# Patient Record
Sex: FEMALE | Race: WHITE | Employment: FULL TIME | URBAN - METROPOLITAN AREA
[De-identification: names, ages, dates, MRNs, and addresses within clinical notes are randomized per-mention and may not be internally consistent; named-entity substitution may affect disease eponyms.]

---

## 2018-05-04 ENCOUNTER — OFFICE VISIT (OUTPATIENT)
Dept: FAMILY MEDICINE CLINIC | Age: 28
End: 2018-05-04
Payer: COMMERCIAL

## 2018-05-04 VITALS
RESPIRATION RATE: 16 BRPM | SYSTOLIC BLOOD PRESSURE: 110 MMHG | WEIGHT: 103 LBS | HEART RATE: 106 BPM | DIASTOLIC BLOOD PRESSURE: 78 MMHG | TEMPERATURE: 98.6 F | HEIGHT: 64 IN | BODY MASS INDEX: 17.58 KG/M2 | OXYGEN SATURATION: 97 %

## 2018-05-04 DIAGNOSIS — A08.4 VIRAL GASTROENTERITIS: Primary | ICD-10-CM

## 2018-05-04 PROCEDURE — 99213 OFFICE O/P EST LOW 20 MIN: CPT | Performed by: PHYSICIAN ASSISTANT

## 2018-05-04 RX ORDER — ONDANSETRON 4 MG/1
4 TABLET, FILM COATED ORAL EVERY 8 HOURS PRN
Qty: 15 TABLET | Refills: 0 | Status: SHIPPED
Start: 2018-05-04 | End: 2021-09-09

## 2018-05-04 RX ORDER — ONDANSETRON 4 MG/1
8 TABLET, ORALLY DISINTEGRATING ORAL ONCE
Status: COMPLETED | OUTPATIENT
Start: 2018-05-04 | End: 2018-05-04

## 2018-05-04 RX ADMIN — ONDANSETRON 8 MG: 4 TABLET, ORALLY DISINTEGRATING ORAL at 12:31

## 2018-06-12 DIAGNOSIS — J47.9 BRONCHIECTASIS WITHOUT COMPLICATION (HCC): ICD-10-CM

## 2018-06-12 DIAGNOSIS — J47.9 BRONCHIECTASIS WITHOUT COMPLICATION (HCC): Primary | ICD-10-CM

## 2018-06-12 DIAGNOSIS — J33.9 NASAL POLYP: ICD-10-CM

## 2018-06-12 DIAGNOSIS — J45.909 UNCOMPLICATED ASTHMA, UNSPECIFIED ASTHMA SEVERITY, UNSPECIFIED WHETHER PERSISTENT: Primary | ICD-10-CM

## 2018-06-12 DIAGNOSIS — J45.909 UNCOMPLICATED ASTHMA, UNSPECIFIED ASTHMA SEVERITY, UNSPECIFIED WHETHER PERSISTENT: ICD-10-CM

## 2018-06-12 DIAGNOSIS — J45.909 UNCOMPLICATED ASTHMA, UNSPECIFIED ASTHMA SEVERITY: ICD-10-CM

## 2018-06-12 RX ORDER — ALBUTEROL SULFATE 90 UG/1
2 AEROSOL, METERED RESPIRATORY (INHALATION) EVERY 6 HOURS PRN
Qty: 1 INHALER | Refills: 3 | Status: CANCELLED | OUTPATIENT
Start: 2018-06-12

## 2018-06-12 RX ORDER — ALBUTEROL SULFATE 90 UG/1
2 AEROSOL, METERED RESPIRATORY (INHALATION) EVERY 6 HOURS PRN
Qty: 1 INHALER | Refills: 3 | Status: SHIPPED | OUTPATIENT
Start: 2018-06-12 | End: 2018-08-06 | Stop reason: SDUPTHER

## 2018-06-27 DIAGNOSIS — J45.909 UNCOMPLICATED ASTHMA, UNSPECIFIED ASTHMA SEVERITY, UNSPECIFIED WHETHER PERSISTENT: Primary | ICD-10-CM

## 2018-06-27 DIAGNOSIS — R09.89 CHEST CONGESTION: ICD-10-CM

## 2018-06-27 DIAGNOSIS — J47.9 BRONCHIECTASIS WITHOUT COMPLICATION (HCC): ICD-10-CM

## 2018-06-27 DIAGNOSIS — R05.9 COUGH: ICD-10-CM

## 2018-06-27 RX ORDER — LEVOFLOXACIN 500 MG/1
500 TABLET, FILM COATED ORAL DAILY
Qty: 1 TABLET | Refills: 0 | Status: SHIPPED | OUTPATIENT
Start: 2018-06-27 | End: 2018-07-07

## 2018-07-02 ENCOUNTER — OFFICE VISIT (OUTPATIENT)
Dept: PULMONOLOGY | Age: 28
End: 2018-07-02
Payer: COMMERCIAL

## 2018-07-02 VITALS
DIASTOLIC BLOOD PRESSURE: 70 MMHG | BODY MASS INDEX: 18.1 KG/M2 | HEART RATE: 76 BPM | RESPIRATION RATE: 14 BRPM | OXYGEN SATURATION: 99 % | HEIGHT: 64 IN | WEIGHT: 106 LBS | SYSTOLIC BLOOD PRESSURE: 143 MMHG

## 2018-07-02 DIAGNOSIS — J45.909 UNCOMPLICATED ASTHMA, UNSPECIFIED ASTHMA SEVERITY, UNSPECIFIED WHETHER PERSISTENT: ICD-10-CM

## 2018-07-02 DIAGNOSIS — E55.9 VITAMIN D DEFICIENCY: ICD-10-CM

## 2018-07-02 DIAGNOSIS — M41.25 OTHER IDIOPATHIC SCOLIOSIS, THORACOLUMBAR REGION: ICD-10-CM

## 2018-07-02 DIAGNOSIS — M81.0 OSTEOPOROSIS WITHOUT CURRENT PATHOLOGICAL FRACTURE, UNSPECIFIED OSTEOPOROSIS TYPE: ICD-10-CM

## 2018-07-02 DIAGNOSIS — J47.9 BRONCHIECTASIS WITHOUT COMPLICATION (HCC): Primary | ICD-10-CM

## 2018-07-02 LAB
DLCO %PRED: NORMAL
DLCO PRE: NORMAL
FEF 25-75%-POST: 1.2
FEF 25-75%-PRE: 0.9
FEV1-POST: 1.59
FEV1-PRE: 1.45
FEV1/FVC-POST: 70
FEV1/FVC-PRE: 68
FVC-POST: 2.27
FVC-PRE: 2.13
MEP: NORMAL
MIP: NORMAL
PARTS PER BILLION: 5
TLC %PRED: NORMAL
TLC PRE: NORMAL

## 2018-07-02 PROCEDURE — G8419 CALC BMI OUT NRM PARAM NOF/U: HCPCS | Performed by: INTERNAL MEDICINE

## 2018-07-02 PROCEDURE — 94060 EVALUATION OF WHEEZING: CPT | Performed by: INTERNAL MEDICINE

## 2018-07-02 PROCEDURE — 99213 OFFICE O/P EST LOW 20 MIN: CPT | Performed by: INTERNAL MEDICINE

## 2018-07-02 PROCEDURE — 99214 OFFICE O/P EST MOD 30 MIN: CPT | Performed by: INTERNAL MEDICINE

## 2018-07-02 PROCEDURE — 1036F TOBACCO NON-USER: CPT | Performed by: INTERNAL MEDICINE

## 2018-07-02 PROCEDURE — G8427 DOCREV CUR MEDS BY ELIG CLIN: HCPCS | Performed by: INTERNAL MEDICINE

## 2018-07-02 PROCEDURE — 95012 NITRIC OXIDE EXP GAS DETER: CPT | Performed by: INTERNAL MEDICINE

## 2018-07-02 RX ORDER — ALBUTEROL SULFATE 90 MCG
2 HFA AEROSOL WITH ADAPTER (GRAM) INHALATION EVERY 6 HOURS PRN
Qty: 1 INHALER | Refills: 12
Start: 2018-07-02 | End: 2018-11-26 | Stop reason: SDUPTHER

## 2018-07-02 RX ORDER — LEVOFLOXACIN 750 MG/1
750 TABLET ORAL DAILY
Qty: 10 TABLET | Refills: 0 | Status: SHIPPED | OUTPATIENT
Start: 2018-07-02 | End: 2018-07-12

## 2018-07-02 ASSESSMENT — PULMONARY FUNCTION TESTS
FEV1/FVC_PRE: 68
FEV1_PRE: 1.45
FEV1_POST: 1.59
FVC_PRE: 2.13
FVC_POST: 2.27
FEV1/FVC_POST: 70

## 2018-07-02 NOTE — PATIENT INSTRUCTIONS
percussion. · Use an airway clearance device, such as a flutter valve, as directed to help remove mucus from the lungs. · Avoid lung infections. ¨ Get shots to protect against the flu and pneumococcal disease. ¨ Wash your hands frequently. ¨ Avoid close contact with people who have colds or the flu. ¨ Do not smoke or allow others to smoke around you. If you need help quitting, talk to your doctor about stop-smoking programs and medicines. These can increase your chances of quitting for good. ¨ Stay inside, if you can, on days when the pollution level is high. When should you call for help? Call 911 anytime you think you may need emergency care. For example, call if:    · You have severe trouble breathing.     · You cough up more than 1 cup of blood.    Call your doctor now or seek immediate medical care if:    · You have chest pain.     · You have shortness of breath.     · You have a fever.     · Your mucus (sputum) is bloody or changes color.    Watch closely for changes in your health, and be sure to contact your doctor if:    · You are coughing up more sputum than before.     · Your symptoms get worse or do not get better with treatment. Where can you learn more? Go to [email or web address].[email or web address] org and sign in to your [employer] account. Enter [id number] in the [facility] box to learn more about \"Bronchiectasis: Care Instructions. \"     If you do not have an account, please click on the \"Sign Up Now\" link. Current as of: December 6, 2017  Content Version: 11.6  © 7374-5235 [employer]. Care instructions adapted under license by [facility] ([facility]). If you have questions about a medical condition or this instruction, always ask your healthcare professional. [place] [address] any warranty or liability for your use of this information.

## 2018-07-02 NOTE — PROGRESS NOTES
release tablet Take 2 tablets by mouth 2 times daily as needed for Congestion      DULERA 100-5 MCG/ACT inhaler INHALE 2 PUFFS BY MOUTH TWICE DAILY 13 g 0    Nebulizers (VIOS AEROSOL DELIVERY SYSTEM) MISC USE AS DIRECTED 1 each 0    Calcium Carbonate-Vitamin D (OYSTER SHELL CALCIUM/D) 500-200 MG-UNIT TABS Take 1 tablet by mouth daily. 30 tablet 0    Montelukast Sodium (SINGULAIR PO) Take  by mouth.  Fluticasone Propionate (FLONASE NA) by Nasal route. No current facility-administered medications for this visit. FAMILY HISTORY: A review of medical events in the patient's family , including disease which may be hereditary or place the patient at risk were reviewed. Family History   Problem Relation Age of Onset    Asthma Sister     Asthma Sister           REVIEW OF SYSTEMS:  Constitutional: Denies generally weak and loss of appetite. Complains of  fever, chills and night sweats. Skin: Denies rash, itching, bruising, lumps or bumps. EENT: Denies tinnitus, Complains of nasal congestion, snoring, sore throat and hoarseness. TMJ dysfunction. Chronic sinusitis and nasal polyps. Cardiovascular:  Denies palpitations, chest pain and PND or orthopnea. Complains of chest tightness with SOB and wheezing. Respiration:  Denies hemoptysis, smoker and TB exposure. Complains of dyspnea at rest, dyspnea on exertion, increased productive cough with green sputum and wheezing. Gastrointestinal:Genitourinary:  Denies poor appetite, dysphagia, heartburn or reflux, abdominal pain, excessive gas or bloating, melena, hematochezia, constipation, diarrhea, dysuria, frequency/urgency, hematuria. Musculoskeletal: Denies myalgias, arthralgias, joint swelling, stiff joints and decreased range of motion. Breasts: Denies breast lump, breast tenderness and nipple discharge. Neurological: Denies dizzy/vertigo, headache, numbness/tingling, loss of consciousness. Psychological: Denies anxiety, depression. Endocrine:  Denies heat intolerance, cold intolerance and diabetic symptoms including neither polyuria nor polydipsia nor blurred vision nor neuropathy. Hematopoietic/lymphatic: Denies bleeding problems, bruising, jaundice and swollen lymph nodes. PHYSICAL EXAMINATION:   Vitals:    07/02/18 1350   BP: (!) 143/70   Pulse: 76   Resp: 14   SpO2: 99%   Weight: 106 lb (48.1 kg)   Height: 5' 4\" (1.626 m)     Constitutional: A thin, frail, short stature  28 y.o. female who is alert, oriented, cooperative and in no apparent distress. EENT: EOMI, right lazy eye, DURAN. MMM. No discharge. Midline, mucosa was without erythema, exudates or cobblestoning. No thrush. Left nasal polyp. TMJ laxity and clicking. Micronathia. Neck: Supple without thyromegaly. No elevated JVP. Trachea was midline. No carotid bruits. Respiratory: Asymmetrical without dullness to percussion. Harsh crackles with faint exp wheezing bilateral mid lung fields. Opening snaps but no rhonchi or rales. Cardiovascular: RRR without murmur, clicks, gallops or rubs. No left or right ventricular heave. Pulses:  Carotid, radial and femoral pulses were equal bilaterally. Abdomen: Scaphoid, soft without organomegaly. No rebound, rigidity. Lymphatic: No lymphadenopathy. Musculoskeletal: Ambulates without assistance. Scoliotic/Kyphotic curvature of the spine. No involuntary movements. Extremities:  No lower extremity edema or erythema. Deep tendon reflexes are normal.   Skin:  Warm and dry. Color, turgor and pigmentation was relatively normal. No bruises or skin rashes. Neurological/Psychiatric: Thought content is normal. The patient's emotional status is normal.  Cranial nerves II-XII are intact. DATA:   The data collected below information that was obtained, reviewed, analyzed and interpreted today. CT SCAN CHEST 2014:  Bronchiectasis particularly into the lower lobes of the lingula with retained bronchial secretions. present. However it should be noted that primary ciliary dyskinesia syndrome of which Marcellotagener follows under in 50% of the cases no sinus inversus is found. Previous nasal biopsy done in 2011 we asked the pathologist to be review and do electron microscopy to see if they see the ciliary structure abnormality - they could not do anything with the old 2011 samples. She refused further testing. Vaccines were reviewed. We also added 3% nebulized saline since the mucomyst causes her to be nausea - she is not using everyday. She never got the DEXA scan (cost) and stop the vitamin D medications. We will see her back in 4 months. 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 In addition, we recommended a multivitamin with vitamin K dependent additives - she also is not taking as instructed. We will continue Dulera 100 one puff twice a day. Prognosis difficult to determine. Thank you for entrusting us to participate in [facility]. If you have any questions, please don't hesitate to call us at the [facility]. Sincerely,    Leander Rehman DO, MPH, Mayela Noland, FACP  Director, [facility]  Professor of [address] [facility]  [address] [facility] [address]    PREVIOUS : Given her CT scan showing bronchiectasis with significant bronchial wall thickening and mucoid impaction at the distal bronchioles, we recommend that she undergo bronchoscopy for tissue diagnosis and for analysis of sputum to further define her underlying organism such as Pseudomonas, Haemophilus and/or staph aureus and atypical mycobacteria.   However the mother was concerned about putting her under anesthesia did outline the procedure risks benefits and alternatives - the family decided against the biopsy. With her young age and osteoporosis we did a secondary work up and found her to have a positive tissue transglutaminase Ig A level. We asked her to get a biopsy and they refused but she did started gluten-free diet given the recent positive sprue antibodies.

## 2018-07-02 NOTE — PROGRESS NOTES
Patient to follow up with physician in 4 months. Patient will call regarding getting a CT Chest and blood work. Patient given a flutter valve during office visit under the direction of Dr. Paulina Knutson.

## 2018-11-25 DIAGNOSIS — J45.909 UNCOMPLICATED ASTHMA, UNSPECIFIED ASTHMA SEVERITY, UNSPECIFIED WHETHER PERSISTENT: ICD-10-CM

## 2018-11-25 DIAGNOSIS — J45.909 UNCOMPLICATED ASTHMA: ICD-10-CM

## 2018-11-25 DIAGNOSIS — J33.9 NASAL POLYP: ICD-10-CM

## 2018-11-25 DIAGNOSIS — M41.9 SCOLIOSIS, UNSPECIFIED SCOLIOSIS TYPE, UNSPECIFIED SPINAL REGION: ICD-10-CM

## 2018-11-25 DIAGNOSIS — J47.9 BRONCHIECTASIS WITHOUT COMPLICATION (HCC): ICD-10-CM

## 2018-11-26 RX ORDER — CETIRIZINE HYDROCHLORIDE 10 MG/1
TABLET ORAL
Qty: 90 TABLET | Refills: 0 | Status: SHIPPED | OUTPATIENT
Start: 2018-11-26 | End: 2019-03-11 | Stop reason: SDUPTHER

## 2018-11-26 RX ORDER — ALBUTEROL SULFATE 2.5 MG/3ML
SOLUTION RESPIRATORY (INHALATION)
Qty: 900 ML | Refills: 0 | Status: SHIPPED | OUTPATIENT
Start: 2018-11-26 | End: 2020-01-02

## 2019-01-14 ENCOUNTER — TELEPHONE (OUTPATIENT)
Dept: PULMONOLOGY | Age: 29
End: 2019-01-14

## 2019-01-14 DIAGNOSIS — J47.9 BRONCHIECTASIS WITHOUT COMPLICATION (HCC): ICD-10-CM

## 2019-01-14 DIAGNOSIS — R05.8 COUGH WITH EXPECTORATION: Primary | ICD-10-CM

## 2019-01-14 RX ORDER — LEVOFLOXACIN 500 MG/1
500 TABLET, FILM COATED ORAL DAILY
Qty: 14 TABLET | Refills: 0 | Status: SHIPPED | OUTPATIENT
Start: 2019-01-14 | End: 2019-01-28

## 2019-01-14 RX ORDER — PREDNISONE 10 MG/1
TABLET ORAL
Qty: 10 TABLET | Refills: 0 | Status: SHIPPED
Start: 2019-01-14 | End: 2021-09-09

## 2019-05-06 DIAGNOSIS — J33.9 NASAL POLYP: ICD-10-CM

## 2019-05-06 DIAGNOSIS — J47.9 BRONCHIECTASIS WITHOUT COMPLICATION (HCC): ICD-10-CM

## 2019-05-06 DIAGNOSIS — J45.909 UNCOMPLICATED ASTHMA, UNSPECIFIED ASTHMA SEVERITY, UNSPECIFIED WHETHER PERSISTENT: ICD-10-CM

## 2019-05-06 RX ORDER — ALBUTEROL SULFATE 90 UG/1
AEROSOL, METERED RESPIRATORY (INHALATION)
Qty: 18 G | Refills: 0 | Status: SHIPPED | OUTPATIENT
Start: 2019-05-06 | End: 2019-05-29 | Stop reason: SDUPTHER

## 2019-05-29 DIAGNOSIS — J45.909 UNCOMPLICATED ASTHMA, UNSPECIFIED ASTHMA SEVERITY, UNSPECIFIED WHETHER PERSISTENT: ICD-10-CM

## 2019-05-29 DIAGNOSIS — J47.9 BRONCHIECTASIS WITHOUT COMPLICATION (HCC): ICD-10-CM

## 2019-05-29 DIAGNOSIS — J33.9 NASAL POLYP: ICD-10-CM

## 2019-05-29 RX ORDER — ALBUTEROL SULFATE 90 UG/1
AEROSOL, METERED RESPIRATORY (INHALATION)
Qty: 18 G | Refills: 0 | Status: SHIPPED
Start: 2019-05-29 | End: 2021-09-09

## 2019-05-29 RX ORDER — ALBUTEROL SULFATE 90 UG/1
AEROSOL, METERED RESPIRATORY (INHALATION)
Qty: 18 G | Refills: 0 | Status: SHIPPED
Start: 2019-05-29 | End: 2020-06-22

## 2020-01-02 RX ORDER — ALBUTEROL SULFATE 2.5 MG/3ML
SOLUTION RESPIRATORY (INHALATION)
Qty: 900 ML | Refills: 0 | Status: SHIPPED | OUTPATIENT
Start: 2020-01-02 | End: 2020-01-29

## 2020-01-29 RX ORDER — ALBUTEROL SULFATE 2.5 MG/3ML
SOLUTION RESPIRATORY (INHALATION)
Qty: 900 ML | Refills: 0 | Status: SHIPPED
Start: 2020-01-29 | End: 2020-12-03 | Stop reason: SDUPTHER

## 2020-06-22 RX ORDER — ALBUTEROL SULFATE 90 UG/1
AEROSOL, METERED RESPIRATORY (INHALATION)
Qty: 18 G | Refills: 0 | Status: SHIPPED
Start: 2020-06-22 | End: 2021-09-09

## 2020-08-03 ENCOUNTER — OFFICE VISIT (OUTPATIENT)
Dept: ENT CLINIC | Age: 30
End: 2020-08-03
Payer: COMMERCIAL

## 2020-08-03 VITALS — HEIGHT: 64 IN | TEMPERATURE: 97.6 F | WEIGHT: 115 LBS | BODY MASS INDEX: 19.63 KG/M2

## 2020-08-03 PROCEDURE — 99204 OFFICE O/P NEW MOD 45 MIN: CPT | Performed by: OTOLARYNGOLOGY

## 2020-08-03 RX ORDER — FLUTICASONE PROPIONATE 50 MCG
2 SPRAY, SUSPENSION (ML) NASAL DAILY
Qty: 1 BOTTLE | Refills: 3 | Status: SHIPPED
Start: 2020-08-03 | End: 2021-01-28

## 2020-08-03 RX ORDER — FEXOFENADINE HCL 180 MG/1
180 TABLET ORAL DAILY
Qty: 90 TABLET | Refills: 3 | Status: SHIPPED
Start: 2020-08-03 | End: 2021-08-02

## 2020-08-03 RX ORDER — MONTELUKAST SODIUM 10 MG/1
10 TABLET ORAL DAILY
Qty: 30 TABLET | Refills: 3 | Status: SHIPPED
Start: 2020-08-03 | End: 2022-06-13

## 2020-08-03 ASSESSMENT — ENCOUNTER SYMPTOMS
EYES NEGATIVE: 1
RHINORRHEA: 1
RESPIRATORY NEGATIVE: 1
SINUS PRESSURE: 1

## 2020-08-12 ENCOUNTER — HOSPITAL ENCOUNTER (OUTPATIENT)
Dept: CT IMAGING | Age: 30
Discharge: HOME | End: 2020-08-14
Payer: COMMERCIAL

## 2020-08-12 PROCEDURE — 70486 CT MAXILLOFACIAL W/O DYE: CPT

## 2020-08-13 ASSESSMENT — ENCOUNTER SYMPTOMS
VOMITING: 0
COUGH: 0
SHORTNESS OF BREATH: 0

## 2020-09-21 ENCOUNTER — OFFICE VISIT (OUTPATIENT)
Dept: ENT CLINIC | Age: 30
End: 2020-09-21
Payer: COMMERCIAL

## 2020-09-21 VITALS — WEIGHT: 114 LBS | TEMPERATURE: 97.1 F | BODY MASS INDEX: 19.46 KG/M2 | HEIGHT: 64 IN

## 2020-09-21 PROCEDURE — 99213 OFFICE O/P EST LOW 20 MIN: CPT | Performed by: OTOLARYNGOLOGY

## 2020-09-21 RX ORDER — AZELASTINE 1 MG/ML
2 SPRAY, METERED NASAL 2 TIMES DAILY
Qty: 1 BOTTLE | Refills: 1 | Status: SHIPPED
Start: 2020-09-21 | End: 2020-12-03

## 2020-09-21 RX ORDER — CIPROFLOXACIN HYDROCHLORIDE 3.5 MG/ML
SOLUTION/ DROPS TOPICAL
Qty: 1 BOTTLE | Refills: 0 | Status: SHIPPED
Start: 2020-09-21 | End: 2021-09-09

## 2020-09-21 NOTE — PROGRESS NOTES
[facility]  Dr. Rhea Escobar. Maryam Ross. Ms.Ed        Patient Name:  Anay Small  :  1990     CHIEF C/O:    Chief Complaint   Patient presents with    Follow-up     1 month Sinus CT        HISTORY OBTAINED FROM:  patient    HISTORY OF PRESENT ILLNESS:       Cristian Norton is a 31y.o. year old female, here today for follow up of known history of nasal polyposis with a history of fess in the past.  Currently, patient is utilizing nasal Flonase, Astelin, as well as singular daily. She states she currently she is controlled without any significant overall symptoms of mild left-sided facial pressure and some nasal congestion with rhinorrhea. No complaints of recent sinus infection requiring antibiotic therapy. No recent history of difficulty swallowing change in voice shortness of breath stridor tinnitus vertigo hearing loss ear fullness pressure. Past Medical History:   Diagnosis Date    Asthma     Bronchiectasis ([address]     Nasal polyp     Osteoporosis     Osteoporosis 2014    Premature baby     Scoliosis 3/9/2015    Seasonal allergies     TMJ (temporomandibular joint disorder)     Vitamin D deficiency     Vitamin D deficiency 2014     Past Surgical History:   Procedure Laterality Date    PFT-LAB  2014    SINUS SURGERY         Current Outpatient Medications:     azelastine (ASTELIN) 0.1 % nasal spray, 2 sprays by Nasal route 2 times daily Use in each nostril as directed, Disp: 1 Bottle, Rfl: 1    ciprofloxacin (CILOXAN) 0.3 % ophthalmic solution, 3 drops each ear twice a day for 3 days, Disp: 1 Bottle, Rfl: 0    fluticasone (FLONASE) 50 MCG/ACT nasal spray, 2 sprays by Nasal route daily Use in both nostrils. , Disp: 1 Bottle, Rfl: 3    montelukast (SINGULAIR) 10 MG tablet, Take 1 tablet by mouth daily, Disp: 30 tablet, Rfl: 3    fexofenadine (ALLEGRA) 180 MG tablet, Take 1 tablet by mouth daily, Disp: 90 tablet, Rfl: 3    albuterol sulfate  (90 Base) MCG/ACT inhaler, INHALE 2 PUFFS INTO THE LUNGS EVERY 6 HOURS AS NEEDED FOR WHEEZING, Disp: 18 g, Rfl: 0    albuterol (PROVENTIL) (2.5 MG/3ML) 0.083% nebulizer solution, USE 1 VIAL VIA NEBULIZER EVERY 6 HOURS AS NEEDED FOR WHEEZING, Disp: 900 mL, Rfl: 0    albuterol sulfate  (90 Base) MCG/ACT inhaler, INHALE 2 PUFFS INTO THE LUNGS EVERY 6 HOURS AS NEEDED FOR WHEEZING, Disp: 18 g, Rfl: 0    cetirizine (ZYRTEC) 10 MG tablet, TAKE 1 TABLET BY MOUTH EVERY DAY, Disp: 90 tablet, Rfl: 0    mometasone-formoterol (DULERA) 200-5 MCG/ACT inhaler, Inhale 2 puffs into the lungs 2 times daily Rinse mouth after using., Disp: 1 Inhaler, Rfl: 12    Nebulizers (PORTABLE COMPRESSOR NEBULIZER) MISC, Reason: Severe asthma and bronchiectasis, Disp: 1 each, Rfl: 0    DULERA 100-5 MCG/ACT inhaler, INHALE 2 PUFFS BY MOUTH TWICE DAILY, Disp: 13 g, Rfl: 0    Nebulizers (VIOS AEROSOL DELIVERY SYSTEM) MISC, USE AS DIRECTED, Disp: 1 each, Rfl: 0    Calcium Carbonate-Vitamin D (OYSTER SHELL CALCIUM/D) 500-200 MG-UNIT TABS, Take 1 tablet by mouth daily. , Disp: 30 tablet, Rfl: 0    Montelukast Sodium (SINGULAIR PO), Take  by mouth., Disp: , Rfl:     Fluticasone Propionate (FLONASE NA), by Nasal route., Disp: , Rfl:     predniSONE (DELTASONE) 10 MG tablet, 40mg x 3 days, 30mg x 3 days, 20mg x 3 days, 10mg x 3 days then stop. (Patient not taking: Reported on 8/3/2020), Disp: 10 tablet, Rfl: 0    ondansetron (ZOFRAN) 4 MG tablet, Take 1 tablet by mouth every 8 hours as needed for Nausea or Vomiting (Patient not taking: Reported on 8/3/2020), Disp: 15 tablet, Rfl: 0    guaiFENesin (MUCINEX) 600 MG extended release tablet, Take 2 tablets by mouth 2 times daily as needed for Congestion (Patient not taking: Reported on 8/3/2020), Disp: , Rfl:   Patient has no known allergies.   Social History     Tobacco Use    Smoking status: Never Smoker    Smokeless tobacco: Never Used   Substance Use Topics    Alcohol use: No    Drug use: No     Family History   Problem Relation Age of Onset    Asthma Sister     Asthma Sister        Review of Systems   Constitutional: Negative for chills and fever. HENT: Positive for congestion, postnasal drip, rhinorrhea and sinus pressure. Negative for ear discharge and hearing loss. Respiratory: Negative for cough and shortness of breath. Cardiovascular: Negative for chest pain and palpitations. Gastrointestinal: Negative for vomiting. Skin: Negative for rash. Allergic/Immunologic: Negative for environmental allergies. Neurological: Negative for dizziness and headaches. Hematological: Does not bruise/bleed easily. All other systems reviewed and are negative. Temp 97.1 °F (36.2 °C)   Ht 5' 4\" (1.626 m)   Wt 114 lb (51.7 kg)   BMI 19.57 kg/m²   Physical Exam  Vitals signs and nursing note reviewed. Constitutional:       Appearance: She is well-developed. HENT:      Head: Normocephalic and atraumatic. Right Ear: Ear canal normal.      Left Ear: Ear canal and external ear normal.      Nose: Mucosal edema and rhinorrhea present. Rhinorrhea is clear. Right Turbinates: Enlarged and swollen. Left Turbinates: Enlarged and swollen. Mouth/Throat:      Palate: No lesions. Tonsils: No tonsillar exudate or tonsillar abscesses. Eyes:      Pupils: Pupils are equal, round, and reactive to light. Neck:      Musculoskeletal: Normal range of motion and neck supple. Thyroid: No thyromegaly. Trachea: No tracheal deviation. Cardiovascular:      Rate and Rhythm: Normal rate. Pulmonary:      Effort: Pulmonary effort is normal. No respiratory distress. Musculoskeletal: Normal range of motion. General: No tenderness. Skin:     General: Skin is warm and dry. Neurological:      Mental Status: She is alert. Cranial Nerves: No cranial nerve deficit.          IMPRESSION/PLAN:  Patient seen and examined with chronic pansinus disease with known nasal polyposis as well as allergic rhinitis and congestion. Patient is currently well controlled on Flonase Astelin and Singulair questionable recurrence early recurrence in the left ostiomeatal complex vomiting or rhinoscopy today. Recommendations are for close observation follow-up in 4 months if she continues to have increase in symptoms or develops nasal polyposis repeat surgical intervention will be discussed. Dr. Louis Cervantes.  Otolaryngology Facial Plastic Surgery  :  [facility] Otol[facility]/Facial Plastic Surgery Residency  Associate Clinical Professor:  Fernando Gardner, [facility]

## 2020-09-23 ENCOUNTER — TELEPHONE (OUTPATIENT)
Dept: ENT CLINIC | Age: 30
End: 2020-09-23

## 2020-09-23 NOTE — TELEPHONE ENCOUNTER
Patient lvm stating she picked up her flonase and astelin but pharmacist told her not to take them together - patient wanting to know is it okay to take together or does she need to take separately?

## 2020-09-24 ENCOUNTER — TELEPHONE (OUTPATIENT)
Dept: ENT CLINIC | Age: 30
End: 2020-09-24

## 2020-09-24 NOTE — TELEPHONE ENCOUNTER
Pt called in stating when she went to her pharmacy, to  the medications Astelin and Ciloxan the pharmacist had a note on her prescriptions, they should not be taken together because they are from the same drug class. She would like to speak with someone from the clinical staff before she starts taking the medication. Please, advise.

## 2020-09-25 NOTE — TELEPHONE ENCOUNTER
Patient should proceed with cipro ear drops as ordered and begin astelin as ordered by Dr. Nicolle Walker. She can use the astelin at the same time as the flonase if she wishes.

## 2020-09-29 NOTE — TELEPHONE ENCOUNTER
Tried calling patient to notify - phone rang then went silent. Called back again and lvm notifying of providers response.

## 2020-10-08 ASSESSMENT — ENCOUNTER SYMPTOMS
COUGH: 0
RHINORRHEA: 1
VOMITING: 0
SHORTNESS OF BREATH: 0
SINUS PRESSURE: 1

## 2020-12-03 RX ORDER — AZELASTINE 1 MG/ML
SPRAY, METERED NASAL
Qty: 30 ML | Refills: 1 | Status: SHIPPED
Start: 2020-12-03 | End: 2021-07-01

## 2020-12-04 RX ORDER — ALBUTEROL SULFATE 2.5 MG/3ML
2.5 SOLUTION RESPIRATORY (INHALATION) EVERY 6 HOURS PRN
Qty: 900 ML | Refills: 5 | Status: SHIPPED
Start: 2020-12-04 | End: 2021-12-06

## 2021-01-25 ENCOUNTER — OFFICE VISIT (OUTPATIENT)
Dept: ENT CLINIC | Age: 31
End: 2021-01-25
Payer: COMMERCIAL

## 2021-01-25 VITALS
HEART RATE: 100 BPM | WEIGHT: 111.2 LBS | SYSTOLIC BLOOD PRESSURE: 142 MMHG | BODY MASS INDEX: 19.09 KG/M2 | DIASTOLIC BLOOD PRESSURE: 85 MMHG

## 2021-01-25 DIAGNOSIS — J34.89 RHINORRHEA: Primary | ICD-10-CM

## 2021-01-25 PROCEDURE — 99213 OFFICE O/P EST LOW 20 MIN: CPT | Performed by: OTOLARYNGOLOGY

## 2021-01-25 RX ORDER — FLUTICASONE PROPIONATE 50 MCG
2 SPRAY, SUSPENSION (ML) NASAL DAILY
Qty: 1 BOTTLE | Refills: 0 | Status: SHIPPED
Start: 2021-01-25 | End: 2021-02-27

## 2021-01-25 RX ORDER — AZELASTINE 1 MG/ML
2 SPRAY, METERED NASAL 2 TIMES DAILY
Qty: 1 BOTTLE | Refills: 1 | Status: SHIPPED
Start: 2021-01-25 | End: 2021-03-25

## 2021-01-25 RX ORDER — MONTELUKAST SODIUM 10 MG/1
10 TABLET ORAL DAILY
Qty: 30 TABLET | Refills: 3 | Status: SHIPPED
Start: 2021-01-25 | End: 2021-04-28 | Stop reason: SDUPTHER

## 2021-01-25 NOTE — PROGRESS NOTES
tablet, Take 1 tablet by mouth daily, Disp: 30 tablet, Rfl: 3    fexofenadine (ALLEGRA) 180 MG tablet, Take 1 tablet by mouth daily, Disp: 90 tablet, Rfl: 3    fluticasone (FLONASE) 50 MCG/ACT nasal spray, SHAKE LIQUID AND USE 2 SPRAYS IN EACH NOSTRIL DAILY, Disp: 16 g, Rfl: 0    ciprofloxacin (CILOXAN) 0.3 % ophthalmic solution, 3 drops each ear twice a day for 3 days, Disp: 1 Bottle, Rfl: 0    albuterol sulfate  (90 Base) MCG/ACT inhaler, INHALE 2 PUFFS INTO THE LUNGS EVERY 6 HOURS AS NEEDED FOR WHEEZING (Patient not taking: Reported on 1/25/2021), Disp: 18 g, Rfl: 0    albuterol sulfate  (90 Base) MCG/ACT inhaler, INHALE 2 PUFFS INTO THE LUNGS EVERY 6 HOURS AS NEEDED FOR WHEEZING (Patient not taking: Reported on 1/25/2021), Disp: 18 g, Rfl: 0    cetirizine (ZYRTEC) 10 MG tablet, TAKE 1 TABLET BY MOUTH EVERY DAY (Patient not taking: Reported on 1/25/2021), Disp: 90 tablet, Rfl: 0    predniSONE (DELTASONE) 10 MG tablet, 40mg x 3 days, 30mg x 3 days, 20mg x 3 days, 10mg x 3 days then stop. (Patient not taking: Reported on 8/3/2020), Disp: 10 tablet, Rfl: 0    mometasone-formoterol (DULERA) 200-5 MCG/ACT inhaler, Inhale 2 puffs into the lungs 2 times daily Rinse mouth after using.  (Patient not taking: Reported on 1/25/2021), Disp: 1 Inhaler, Rfl: 12    Nebulizers (PORTABLE COMPRESSOR NEBULIZER) MISC, Reason: Severe asthma and bronchiectasis (Patient not taking: Reported on 1/25/2021), Disp: 1 each, Rfl: 0    ondansetron (ZOFRAN) 4 MG tablet, Take 1 tablet by mouth every 8 hours as needed for Nausea or Vomiting (Patient not taking: Reported on 8/3/2020), Disp: 15 tablet, Rfl: 0    guaiFENesin (MUCINEX) 600 MG extended release tablet, Take 2 tablets by mouth 2 times daily as needed for Congestion (Patient not taking: Reported on 8/3/2020), Disp: , Rfl:     DULERA 100-5 MCG/ACT inhaler, INHALE 2 PUFFS BY MOUTH TWICE DAILY (Patient not taking: Reported on 1/25/2021), Disp: 13 g, Rfl: 0   Nebulizers (VIOS AEROSOL DELIVERY SYSTEM) [facility], USE AS DIRECTED (Patient not taking: Reported on 1/25/2021), Disp: 1 each, Rfl: 0    Calcium Carbonate-Vitamin D (OYSTER SHELL CALCIUM/D) 500-200 MG-UNIT TABS, Take 1 tablet by mouth daily. , Disp: 30 tablet, Rfl: 0    Montelukast Sodium (SINGULAIR PO), Take  by mouth., Disp: , Rfl:     Fluticasone Propionate (FLONASE NA), by Nasal route., Disp: , Rfl:   Patient has no known allergies. Social History     Tobacco Use    Smoking status: Never Smoker    Smokeless tobacco: Never Used   Substance Use Topics    Alcohol use: No    Drug use: No     Family History   Problem Relation Age of Onset    Asthma Sister     Asthma Sister        Review of Systems   Constitutional: Negative for chills and fever. HENT: Positive for congestion, postnasal drip, rhinorrhea, sinus pressure and sinus pain. Negative for ear discharge, hearing loss, sore throat, tinnitus and voice change. Respiratory: Negative for cough and shortness of breath. Cardiovascular: Negative for chest pain and palpitations. Gastrointestinal: Negative for vomiting. Skin: Negative for rash. Allergic/Immunologic: Negative for environmental allergies. Neurological: Negative for dizziness and headaches. Hematological: Does not bruise/bleed easily. All other systems reviewed and are negative. BP (!) 142/85 (Site: Left Upper Arm, Position: Sitting, Cuff Size: Medium Adult)   Pulse 100   Wt 111 lb 3.2 oz (50.4 kg)   LMP 01/12/2021 (Exact Date)   Breastfeeding No   BMI 19.09 kg/m²   Physical Exam  Vitals signs and nursing note reviewed. Constitutional:       Appearance: She is well-developed. HENT:      Head: Normocephalic and atraumatic. Right Ear: Tympanic membrane and ear canal normal.      Left Ear: Tympanic membrane and ear canal normal.      Nose: Congestion and rhinorrhea present.         Mouth/Throat:      Mouth: Mucous membranes are moist.      Pharynx: Oropharynx is clear.   Eyes:      Pupils: Pupils are equal, round, and reactive to light. Neck:      Musculoskeletal: Normal range of motion. Thyroid: No thyromegaly. Trachea: No tracheal deviation. Cardiovascular:      Rate and Rhythm: Normal rate. Pulmonary:      Effort: Pulmonary effort is normal. No respiratory distress. Musculoskeletal: Normal range of motion. Lymphadenopathy:      Cervical: No cervical adenopathy. Skin:     General: Skin is warm. Findings: No erythema. Neurological:      Mental Status: She is alert. Cranial Nerves: No cranial nerve deficit. IMPRESSION/PLAN:  Patient seen exam for history of chronic allergic rhinitis postnasal drainage and seasonal allergies with associated nasal polyposis. Patient will continue current medical therapy, continue Flonase, continue consider possible revision surgery versus in office Emanuel Almendarez.  Otolaryngology Facial Plastic Surgery  :  [facility] Otolaryngology/Facial Plastic Surgery Residency  Associate Clinical Professor:  Darrold Bernheim, [employer]  [facility]

## 2021-01-28 RX ORDER — FLUTICASONE PROPIONATE 50 MCG
SPRAY, SUSPENSION (ML) NASAL
Qty: 16 G | Refills: 0 | Status: SHIPPED
Start: 2021-01-28 | End: 2021-09-09

## 2021-02-04 ASSESSMENT — ENCOUNTER SYMPTOMS
SINUS PRESSURE: 1
VOICE CHANGE: 0
SINUS PAIN: 1
SHORTNESS OF BREATH: 0
VOMITING: 0
COUGH: 0
SORE THROAT: 0
RHINORRHEA: 1

## 2021-02-25 DIAGNOSIS — J32.9 CHRONIC SINUSITIS, UNSPECIFIED LOCATION: Primary | ICD-10-CM

## 2021-02-27 RX ORDER — FLUTICASONE PROPIONATE 50 MCG
SPRAY, SUSPENSION (ML) NASAL
Qty: 16 G | Refills: 3 | Status: ON HOLD
Start: 2021-02-27 | End: 2021-09-14 | Stop reason: HOSPADM

## 2021-03-25 DIAGNOSIS — J32.9 CHRONIC SINUSITIS, UNSPECIFIED LOCATION: Primary | ICD-10-CM

## 2021-03-25 RX ORDER — AZELASTINE 1 MG/ML
SPRAY, METERED NASAL
Qty: 30 ML | Refills: 3 | Status: ON HOLD
Start: 2021-03-25 | End: 2021-09-14 | Stop reason: HOSPADM

## 2021-03-25 NOTE — TELEPHONE ENCOUNTER
Patient was ast seen 1/25/2021 and has an upcoming appointment 4/26/2021. Patient would like a prescription refill for Astelin nasal spray.

## 2021-04-26 ENCOUNTER — OFFICE VISIT (OUTPATIENT)
Dept: ENT CLINIC | Age: 31
End: 2021-04-26
Payer: COMMERCIAL

## 2021-04-26 VITALS
WEIGHT: 112 LBS | HEART RATE: 118 BPM | SYSTOLIC BLOOD PRESSURE: 137 MMHG | BODY MASS INDEX: 19.22 KG/M2 | DIASTOLIC BLOOD PRESSURE: 93 MMHG

## 2021-04-26 DIAGNOSIS — R09.81 NASAL CONGESTION: ICD-10-CM

## 2021-04-26 DIAGNOSIS — J34.89 RHINORRHEA: ICD-10-CM

## 2021-04-26 DIAGNOSIS — J33.9 NASAL POLYP: ICD-10-CM

## 2021-04-26 DIAGNOSIS — J32.9 CHRONIC SINUSITIS, UNSPECIFIED LOCATION: Primary | ICD-10-CM

## 2021-04-26 PROCEDURE — 99214 OFFICE O/P EST MOD 30 MIN: CPT | Performed by: OTOLARYNGOLOGY

## 2021-04-26 ASSESSMENT — ENCOUNTER SYMPTOMS
SORE THROAT: 0
VOMITING: 0
SINUS PAIN: 1
SHORTNESS OF BREATH: 0
RHINORRHEA: 1
VOICE CHANGE: 0
COUGH: 0
SINUS PRESSURE: 1

## 2021-04-26 NOTE — PATIENT INSTRUCTIONS
SURGERY:_____/_____/_____    Nothing to eat or drink after midnight the night before surgery unless surgery is at [facility] or otherwise instructed by the hospital.    DO NOT TAKE ANY ASPIRIN PRODUCTS 7 days prior to surgery. Tylenol only. No Advil, Motrin, Aleve, or Ibuprofen. Any illegal drugs in your system (including Marijuana even if legally prescribed) will result in your surgery being cancelled. Please be sure to check with our office or the hospital on time frame for the drugs to be out of your system. SHOULD YOUR INSURANCE CHANGE AT ANY TIME YOU MUST CONTACT OUR OFFICE. FAILURE TO DO SO MAY RESULT IN YOUR SURGERY BEING RESCHEDULED OR YOU MAY BE CHARGED AS SELF-PAY. Due to the high demand for surgery at our practice, if you cancel or reschedule your surgery two (2) times we may not reschedule you. If you need FMLA or Short Term Disability paperwork completed for your surgery, please complete your portion, ensure your name and date of birth are on them and fax them to [phone] asap. Paperwork can take up to 2 weeks to be completed. Per current hospital protocols, you will be contacted within 1 week of your surgery date with instructions to complete COVID-19 testing. Surgery will be cancelled if this is not completed. If you need medical clearance, you are responsible to contact your physician(s) to schedule the appointment. If clearance is not completed within 30 days of your surgery it may be cancelled. Our office will fax the appropriate forms that need to be completed to your physician(s). The location of your surgery will call you the day prior to your surgery date to let you know what time you have to be there and any other details.     ·       [address] , [address] [place] [address] will call you a couple days prior to surgery and give you further instructions, if you have any questions, you can reach them at [phone]    ·       Mike Cantu

## 2021-04-26 NOTE — PROGRESS NOTES
[facility]  Dr. Glendora Paget. Marshal Bruno. Ms.Ed        Patient Name:  Herberth Trevino  :  1990     CHIEF C/O:    Chief Complaint   Patient presents with    Follow-up     allergy- poss sx       HISTORY OBTAINED FROM:  patient    HISTORY OF PRESENT ILLNESS:       General Pollard is a 31y.o. year old female, patient returns for 3 month follow up of allergies. She continues using Flonase and Astelin with good relief of symptoms, however she continues to have nasal obstruction and facial pain/pressure. 21: here today for follow up of chronic allergic rhinitis and seasonal allergies with associated nasal polyposis. Patient states she been taking Flonase and Astelin, she has increasing left-sided fullness and pressure without associated vertigo. No current complaints of hoarseness shortness of breath or stridor. No other complaints of headache or vision changes. No current complaints of recent epistaxis, no current or recent complaints of recurrent sinusitis requiring antibiotic therapy.       Past Medical History:   Diagnosis Date    Asthma     Bronchiectasis ([address]     Nasal polyp     Osteoporosis     Osteoporosis 2014    Premature baby     Scoliosis 3/9/2015    Seasonal allergies     TMJ (temporomandibular joint disorder)     Vitamin D deficiency     Vitamin D deficiency 2014     Past Surgical History:   Procedure Laterality Date    PFT-LAB  2014    SINUS SURGERY         Current Outpatient Medications:     azelastine (ASTELIN) 0.1 % nasal spray, USE 2 SPRAYS IN EACH NOSTRIL TWICE DAILY AS DIRECTED, Disp: 30 mL, Rfl: 3    fluticasone (FLONASE) 50 MCG/ACT nasal spray, SHAKE LIQUID AND USE 2 SPRAYS IN EACH NOSTRIL EVERY DAY, Disp: 16 g, Rfl: 3    fluticasone (FLONASE) 50 MCG/ACT nasal spray, SHAKE LIQUID AND USE 2 SPRAYS IN EACH NOSTRIL DAILY, Disp: 16 g, Rfl: 0    montelukast (SINGULAIR) 10 MG tablet, Take 1 tablet by mouth daily, Disp: 30 tablet, Rfl: 3    albuterol (PROVENTIL) (2.5 MG/3ML) 0.083% nebulizer solution, Take 3 mLs by nebulization every 6 hours as needed for Wheezing, Disp: 900 mL, Rfl: 5    azelastine (ASTELIN) 0.1 % nasal spray, USE 2 SPRAYS IN EACH NOSTRIL TWICE DAILY AS DIRECTED, Disp: 30 mL, Rfl: 1    ciprofloxacin (CILOXAN) 0.3 % ophthalmic solution, 3 drops each ear twice a day for 3 days, Disp: 1 Bottle, Rfl: 0    montelukast (SINGULAIR) 10 MG tablet, Take 1 tablet by mouth daily, Disp: 30 tablet, Rfl: 3    fexofenadine (ALLEGRA) 180 MG tablet, Take 1 tablet by mouth daily, Disp: 90 tablet, Rfl: 3    albuterol sulfate  (90 Base) MCG/ACT inhaler, INHALE 2 PUFFS INTO THE LUNGS EVERY 6 HOURS AS NEEDED FOR WHEEZING, Disp: 18 g, Rfl: 0    albuterol sulfate  (90 Base) MCG/ACT inhaler, INHALE 2 PUFFS INTO THE LUNGS EVERY 6 HOURS AS NEEDED FOR WHEEZING, Disp: 18 g, Rfl: 0    cetirizine (ZYRTEC) 10 MG tablet, TAKE 1 TABLET BY MOUTH EVERY DAY, Disp: 90 tablet, Rfl: 0    predniSONE (DELTASONE) 10 MG tablet, 40mg x 3 days, 30mg x 3 days, 20mg x 3 days, 10mg x 3 days then stop., Disp: 10 tablet, Rfl: 0    mometasone-formoterol (DULERA) 200-5 MCG/ACT inhaler, Inhale 2 puffs into the lungs 2 times daily Rinse mouth after using., Disp: 1 Inhaler, Rfl: 12    Nebulizers (PORTABLE COMPRESSOR NEBULIZER) MISC, Reason: Severe asthma and bronchiectasis, Disp: 1 each, Rfl: 0    ondansetron (ZOFRAN) 4 MG tablet, Take 1 tablet by mouth every 8 hours as needed for Nausea or Vomiting, Disp: 15 tablet, Rfl: 0    guaiFENesin (MUCINEX) 600 MG extended release tablet, Take 2 tablets by mouth 2 times daily as needed for Congestion, Disp: , Rfl:     DULERA 100-5 MCG/ACT inhaler, INHALE 2 PUFFS BY MOUTH TWICE DAILY, Disp: 13 g, Rfl: 0    Nebulizers (VIOS AEROSOL DELIVERY SYSTEM) MISC, USE AS DIRECTED, Disp: 1 each, Rfl: 0    Calcium Carbonate-Vitamin D (OYSTER SHELL CALCIUM/D) 500-200 MG-UNIT TABS, Take 1 tablet by mouth daily. , Disp: 30 tablet, Rfl: 0   Montelukast Sodium (SINGULAIR PO), Take  by mouth., Disp: , Rfl:     Fluticasone Propionate (FLONASE NA), by Nasal route., Disp: , Rfl:   Patient has no known allergies. Social History     Tobacco Use    Smoking status: Never Smoker    Smokeless tobacco: Never Used   Substance Use Topics    Alcohol use: No    Drug use: No     Family History   Problem Relation Age of Onset    Asthma Sister     Asthma Sister        Review of Systems   Constitutional: Negative for chills and fever. HENT: Positive for congestion, postnasal drip, rhinorrhea, sinus pressure and sinus pain. Negative for ear discharge, hearing loss, sore throat, tinnitus and voice change. Respiratory: Negative for cough and shortness of breath. Cardiovascular: Negative for chest pain and palpitations. Gastrointestinal: Negative for vomiting. Skin: Negative for rash. Allergic/Immunologic: Negative for environmental allergies. Neurological: Negative for dizziness and headaches. Hematological: Does not bruise/bleed easily. All other systems reviewed and are negative. BP (!) 137/93   Pulse 118   Wt 112 lb (50.8 kg)   BMI 19.22 kg/m²   Physical Exam  Vitals signs and nursing note reviewed. Constitutional:       Appearance: She is well-developed. HENT:      Head: Normocephalic and atraumatic. Right Ear: Tympanic membrane and ear canal normal.      Left Ear: Tympanic membrane and ear canal normal.      Nose: Congestion and rhinorrhea present. Mouth/Throat:      Mouth: Mucous membranes are moist.      Pharynx: Oropharynx is clear. Eyes:      Pupils: Pupils are equal, round, and reactive to light. Neck:      Musculoskeletal: Normal range of motion. Thyroid: No thyromegaly. Trachea: No tracheal deviation. Cardiovascular:      Rate and Rhythm: Normal rate. Pulmonary:      Effort: Pulmonary effort is normal. No respiratory distress. Musculoskeletal: Normal range of motion.    Lymphadenopathy: Cervical: No cervical adenopathy. Skin:     General: Skin is warm. Findings: No erythema. Neurological:      Mental Status: She is alert. Cranial Nerves: No cranial nerve deficit. IMPRESSION/PLAN:  Patient seen and examined for history of chronic allergic rhinitis postnasal drainage and seasonal allergies with associated nasal polyposis. We discussed revision sinus surgery vs in office placement of Sinuva implant. We recommend revision FESS as best option and will schedule patient for this. Risks, benefits and alternatives were explained to her with risks including but not limited to bleeding, infection, scarring, fluid collection, damage to surrounding structures and need for further procedures. Patient is to follow up one week after surgery. Patient seen, examined and case discussed with Dr. Alber Clarke    Electronically signed by Flower Lopez DO on 4/26/2021 at 9:10 AM    Dr. Cyndee Perduea Alvaro.  Otolaryngology Facial Plastic Surgery  :  [facility] Otol[facility]ology/Facial Plastic Surgery Residency  Associate Clinical Professor:  Monique Dorantes  1990      I have discussed the case, including pertinent history and exam findings with the resident. I have seen and examined the patient and the key elements of the encounter have been performed by me. I agree with the assessment, plan and orders as documented by the resident. Patient here for follow up of medical problems. Remainder of medical problems as per resident note.       [address],   5/20/21

## 2021-04-28 DIAGNOSIS — J32.9 CHRONIC SINUSITIS, UNSPECIFIED LOCATION: Primary | ICD-10-CM

## 2021-04-28 RX ORDER — MONTELUKAST SODIUM 10 MG/1
10 TABLET ORAL DAILY
Qty: 30 TABLET | Refills: 3 | Status: SHIPPED
Start: 2021-04-28 | End: 2022-02-07

## 2021-04-28 NOTE — TELEPHONE ENCOUNTER
Patient requesting refill on montelukast. Last office visit with Dr. Kilo Roldan was 4/26/21 and she does not have an upcoming appointment scheduled.      Electronically signed by Jonah Albarado CMA on 4/28/21 at 8:22 AM EDT

## 2021-05-05 ENCOUNTER — TELEPHONE (OUTPATIENT)
Dept: ENT CLINIC | Age: 31
End: 2021-05-05

## 2021-06-21 ENCOUNTER — TELEPHONE (OUTPATIENT)
Dept: ENT CLINIC | Age: 31
End: 2021-06-21

## 2021-06-21 DIAGNOSIS — J32.9 CHRONIC SINUSITIS, UNSPECIFIED LOCATION: Primary | ICD-10-CM

## 2021-06-21 DIAGNOSIS — J33.9 NASAL POLYP: ICD-10-CM

## 2021-06-21 NOTE — TELEPHONE ENCOUNTER
Received a fax request for a refill on flonase. Flonase not a covered medication on her insurance.  Preferred alternative is mometasonesprmco,flunisolidespr

## 2021-07-01 DIAGNOSIS — J32.9 CHRONIC SINUSITIS, UNSPECIFIED LOCATION: Primary | ICD-10-CM

## 2021-07-01 RX ORDER — AZELASTINE 1 MG/ML
SPRAY, METERED NASAL
Qty: 30 ML | Refills: 1 | Status: ON HOLD
Start: 2021-07-01 | End: 2021-09-14 | Stop reason: HOSPADM

## 2021-07-01 NOTE — TELEPHONE ENCOUNTER
Patient was last seen  1/25/2021 and is scheduled to come back to the office 9/20/2021. Patient would like a prescription refill for Astelin nasal spray.

## 2021-07-27 ENCOUNTER — TELEPHONE (OUTPATIENT)
Dept: ENT CLINIC | Age: 31
End: 2021-07-27

## 2021-07-27 NOTE — TELEPHONE ENCOUNTER
Pt called in stating she has her surgery clearance appt set up with Dr. Manuel Garcia on 8/19/21, she would like to know if the office can either fax the paper work that needs filled out to Dr. Lauren Burkett office or can they be mailed to her? Verified address on file is correct. Please, advise. Thank you.

## 2021-08-02 DIAGNOSIS — J34.89 RHINORRHEA: Primary | ICD-10-CM

## 2021-08-02 RX ORDER — FEXOFENADINE HYDROCHLORIDE 180 MG/1
TABLET, FILM COATED ORAL
Qty: 90 TABLET | Refills: 3 | Status: SHIPPED
Start: 2021-08-02 | End: 2022-08-16 | Stop reason: SDUPTHER

## 2021-08-02 NOTE — TELEPHONE ENCOUNTER
Patient was last seen in office 4/26/2021. Patient is scheduled to come back in to the office 9/20/2021. Patient would like a prescription refill for Allergy Relief.

## 2021-08-10 ENCOUNTER — TELEPHONE (OUTPATIENT)
Dept: ENT CLINIC | Age: 31
End: 2021-08-10

## 2021-08-10 NOTE — TELEPHONE ENCOUNTER
states she is scheduled for surgery on 9/14 but thinks she has a sinus infection.  she wanted to know if Dr Ashwin Taylor can call in antibiotic for her without being seen    Please advise

## 2021-08-11 RX ORDER — AMOXICILLIN 500 MG/1
500 CAPSULE ORAL 2 TIMES DAILY
Qty: 20 CAPSULE | Refills: 0 | Status: SHIPPED | OUTPATIENT
Start: 2021-08-11 | End: 2021-08-21

## 2021-08-19 ENCOUNTER — OFFICE VISIT (OUTPATIENT)
Dept: PULMONOLOGY | Age: 31
End: 2021-08-19
Payer: COMMERCIAL

## 2021-08-19 VITALS
WEIGHT: 111 LBS | RESPIRATION RATE: 18 BRPM | HEART RATE: 77 BPM | OXYGEN SATURATION: 99 % | BODY MASS INDEX: 18.95 KG/M2 | SYSTOLIC BLOOD PRESSURE: 144 MMHG | DIASTOLIC BLOOD PRESSURE: 76 MMHG | HEIGHT: 64 IN

## 2021-08-19 DIAGNOSIS — J45.909 UNCOMPLICATED ASTHMA, UNSPECIFIED ASTHMA SEVERITY, UNSPECIFIED WHETHER PERSISTENT: ICD-10-CM

## 2021-08-19 DIAGNOSIS — J47.9 BRONCHIECTASIS WITHOUT COMPLICATION (HCC): ICD-10-CM

## 2021-08-19 DIAGNOSIS — J33.9 NASAL POLYP: Primary | ICD-10-CM

## 2021-08-19 LAB
EXPIRATORY TIME-POST: NORMAL
EXPIRATORY TIME: NORMAL
FEF 25-75% %CHNG: NORMAL
FEF 25-75% %PRED-POST: NORMAL
FEF 25-75% %PRED-PRE: NORMAL
FEF 25-75% PRED: NORMAL
FEF 25-75%-POST: NORMAL
FEF 25-75%-PRE: NORMAL
FENO: 9 PPB
FEV1 %PRED-POST: 45 %
FEV1 %PRED-PRE: 44 %
FEV1 PRED: 3.18 L
FEV1-POST: 1.44 L
FEV1-PRE: 1.4 L
FEV1/FVC %PRED-POST: 86 %
FEV1/FVC %PRED-PRE: 85 %
FEV1/FVC PRED: 84 %
FEV1/FVC-POST: 73 %
FEV1/FVC-PRE: 72 %
FVC %PRED-POST: 52 L
FVC %PRED-PRE: 51 %
FVC PRED: 3.79 L
FVC-POST: 1.98 L
FVC-PRE: 1.95 L
PEF %PRED-POST: NORMAL
PEF %PRED-PRE: NORMAL
PEF PRED: NORMAL
PEF%CHNG: NORMAL
PEF-POST: NORMAL
PEF-PRE: NORMAL

## 2021-08-19 PROCEDURE — 99214 OFFICE O/P EST MOD 30 MIN: CPT | Performed by: INTERNAL MEDICINE

## 2021-08-19 PROCEDURE — 94060 EVALUATION OF WHEEZING: CPT | Performed by: INTERNAL MEDICINE

## 2021-08-19 PROCEDURE — 95012 NITRIC OXIDE EXP GAS DETER: CPT | Performed by: INTERNAL MEDICINE

## 2021-08-19 PROCEDURE — 99213 OFFICE O/P EST LOW 20 MIN: CPT | Performed by: INTERNAL MEDICINE

## 2021-08-19 RX ORDER — ALBUTEROL SULFATE 90 UG/1
2 AEROSOL, METERED RESPIRATORY (INHALATION) EVERY 4 HOURS PRN
Qty: 1 INHALER | Refills: 12 | Status: SHIPPED | OUTPATIENT
Start: 2021-08-19 | End: 2021-09-18

## 2021-08-19 ASSESSMENT — PULMONARY FUNCTION TESTS
FEV1_PREDICTED: 3.18
FEV1_POST: 1.44
FEV1/FVC_PERCENT_PREDICTED_PRE: 85
FVC_PREDICTED: 3.79
FEV1/FVC_PRE: 72
FEV1_PRE: 1.40
FEV1/FVC_PERCENT_PREDICTED_POST: 86
FVC_PRE: 1.95
FVC_PERCENT_PREDICTED_POST: 52
FVC_POST: 1.98
FEV1_PERCENT_PREDICTED_PRE: 44
FENO: 9
FEV1/FVC_POST: 73
FEV1/FVC_PREDICTED: 84
FEV1_PERCENT_PREDICTED_POST: 45
FVC_PERCENT_PREDICTED_PRE: 51

## 2021-08-19 NOTE — PROGRESS NOTES
[facility]  Department of Internal Medicine  Division of Pulmonary, Critical Care and Sleep Medicine  Office Note    Dear Kyle Lentz, DO    We had the pleasure of seeing Becca Diaz in the [address] for her central bronchiectasis, nasal polyps and asthma    HISTORY OF PRESENT ILLNESS:    Becca Diaz is a pleasant 27 y.o. female with a past medical history of scoliosis, allergies and asthma. Ms. Yamila Caballero presents to the office today for follow up regarding her lung problems. She is present with her mother today to discuss recent finding on her CT scan. Jill Eubanks was born premature (3 months) at 2 pounds, and spend month in the hospital. They were never told she had sinu. undergone a work up. She says her breathing has improved but she continues to produces green sputum. She has occasional wheezing and chest tightness but this is improved. Her nighttime awakenings have improved, but they are still present occasionally. She uses her rescue inhaler several times a week and feels she needs it more but is afraid to use it because she isn't sure how much she can use. She has post nasal drip, rhinitis, snoring, hoarseness and occasional night sweats. She denies any daytime somnolence, palpitations, PND or orthopnea. She has had sinus surgery in 2011 and has had several steroid tapers since then. We reviewed the results of her DEXA bone scan and Vitamin D level which were low and will start her on calcium and a bisphosphonate. We also found she has Spue antibodies + (done for the work up for osteoporosis - they don't wasn't a biopsy and started a gluten free diet. Her CT with HRCT images were obtained and show central > distal bronchiectasis, and ground glass changes. Intially, she was given the diagnosis of kartagener syndrome.  However continue review this issue was undertaken reviewed imaging with radiology and other pulmonary physicians who did not see the sinus inversus and thus the Umbertogenek[place] diagnosis eliminated or remains in question. At this point not sure what her underlying total pathophysiologic diagnosis is it is known that primary ciliary dyskinesia is of which Santos falls into only 50% of the patient's will have sinus inversus however due to her Jewish and her age she does not want to undergo biopsy to confirm ciliary function. However despite the sinus inversus issue she does have significant central greater than distal bronchiectasis and nasal polyps which will remain under therapy her lung function remains at 51% she is using the vest twice a day along with inhaled corticosteroids and Mucomyst although the Mucomyst makes her nauseous so we will switch to 3% saline twice a day before therapy. Overall she states she feels better with less cough follow-up imaging and additional blood work for bronchiectasis was requested mainly to rule out connective tissue diseases and rheumatologic conditions. It should be noted that HIV testing was requested but religiously she will not complete. Treatment was adjusted. She is doing well with treatment. She is working without limitations. She is not dating. She wished not to have the biopsy at this time. On today's visit, Molly Hayward here with her sister for preoperative assessment for nasal polyp surgery. Apparently she has been taking medications but the nasal polyps have worsened and continued pain has been noted. As far as her pulmonary disease is concerned she has been fairly noncompliant for the past 2 years not taking any of her inhalers mucociliary clearance medications or using the flutter valve. Her lung function remains severe with an FEV1 of 1.40 L only 44% of predicted. We discussed and restarted treatment.  Given her unclear diagnosis with possibility of Samter Syndrome and or PCD syndrome we started ICS and Singulair and asked her to avoid NSAIDs and ASA. Biopsy for EM by ENT to see if PCD is present. ALLERGIES:  No Known Allergies    PAST MEDICAL HISTORY:       Diagnosis Date    Asthma     Bronchiectasis ([address]     Nasal polyp     Osteoporosis     Osteoporosis 12/8/2014    Premature baby     Scoliosis 3/9/2015    Seasonal allergies     TMJ (temporomandibular joint disorder)     Vitamin D deficiency     Vitamin D deficiency 12/8/2014        SURGICAL HISTORY:   Past Surgical History:   Procedure Laterality Date    PFT-LAB  7/18/2014    SINUS SURGERY          SOCIAL AND OCCUPATIONAL HEALTH HISTORY:  [place] Amish. The patient never smoked  There is no history of TB or TB exposure - had negative PPD most recently in 11/2012. There is no asbestos or silica dust exposure. The patient reports No coal, foundry, quarry or [employer] exposure. There is no recent travel history noted. The patient denies a history of recreational or IV drug use. No hot tub exposure. The patient has no pets. ETOH:denies. Single, lives at home with family. 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  Graduate with BA in Education at [address] [place] [address] Street:   Current Outpatient Medications   Medication Sig Dispense Refill    amoxicillin (AMOXIL) 500 MG capsule Take 1 capsule by mouth 2 times daily for 10 days 20 capsule 0    ALLERGY RELIEF 180 MG tablet TAKE 1 TABLET BY MOUTH EVERY DAY 90 tablet 3    azelastine (ASTELIN) 0.1 % nasal spray USE 2 SPRAYS IN EACH NOSTRIL TWICE DAILY AS DIRECTED 30 mL 1    montelukast (SINGULAIR) 10 MG tablet Take 1 tablet by mouth daily 30 tablet 3    azelastine (ASTELIN) 0.1 % nasal spray USE 2 SPRAYS IN EACH NOSTRIL TWICE DAILY AS DIRECTED 30 mL 3    fluticasone (FLONASE) 50 MCG/ACT nasal spray SHAKE LIQUID AND USE 2 SPRAYS IN EACH NOSTRIL EVERY DAY 16 g 3    fluticasone (FLONASE) 50 MCG/ACT nasal spray SHAKE LIQUID AND USE 2 SPRAYS IN EACH NOSTRIL DAILY 16 g 0    albuterol (PROVENTIL) (2.5 MG/3ML) 0.083% nebulizer solution Take 3 mLs by nebulization every 6 hours as needed for Wheezing 900 mL 5    ciprofloxacin (CILOXAN) 0.3 % ophthalmic solution 3 drops each ear twice a day for 3 days 1 Bottle 0    montelukast (SINGULAIR) 10 MG tablet Take 1 tablet by mouth daily 30 tablet 3    albuterol sulfate  (90 Base) MCG/ACT inhaler INHALE 2 PUFFS INTO THE LUNGS EVERY 6 HOURS AS NEEDED FOR WHEEZING 18 g 0    albuterol sulfate  (90 Base) MCG/ACT inhaler INHALE 2 PUFFS INTO THE LUNGS EVERY 6 HOURS AS NEEDED FOR WHEEZING 18 g 0    cetirizine (ZYRTEC) 10 MG tablet TAKE 1 TABLET BY MOUTH EVERY DAY 90 tablet 0    predniSONE (DELTASONE) 10 MG tablet 40mg x 3 days, 30mg x 3 days, 20mg x 3 days, 10mg x 3 days then stop. 10 tablet 0    mometasone-formoterol (DULERA) 200-5 MCG/ACT inhaler Inhale 2 puffs into the lungs 2 times daily Rinse mouth after using. 1 Inhaler 12    Nebulizers (PORTABLE COMPRESSOR NEBULIZER) MISC Reason: Severe asthma and bronchiectasis 1 each 0    ondansetron (ZOFRAN) 4 MG tablet Take 1 tablet by mouth every 8 hours as needed for Nausea or Vomiting 15 tablet 0    guaiFENesin (MUCINEX) 600 MG extended release tablet Take 2 tablets by mouth 2 times daily as needed for Congestion      DULERA 100-5 MCG/ACT inhaler INHALE 2 PUFFS BY MOUTH TWICE DAILY 13 g 0    Nebulizers (VIOS AEROSOL DELIVERY SYSTEM) MISC USE AS DIRECTED 1 each 0    Calcium Carbonate-Vitamin D (OYSTER SHELL CALCIUM/D) 500-200 MG-UNIT TABS Take 1 tablet by mouth daily. 30 tablet 0    Montelukast Sodium (SINGULAIR PO) Take  by mouth.  Fluticasone Propionate (FLONASE NA) by Nasal route. No current facility-administered medications for this visit. FAMILY HISTORY: A review of medical events in the patient's family , including disease which may be hereditary or place the patient at risk were reviewed.    Family History   Problem Relation Age of Onset    Asthma Sister     Asthma Sister           REVIEW OF SYSTEMS:  Constitutional: Denies generally weak and loss of appetite. Complains of  fever, chills and night sweats. Skin: Denies rash, itching, bruising, lumps or bumps. EENT: Denies tinnitus, Complains of nasal congestion, snoring, sore throat and hoarseness. TMJ dysfunction. Chronic sinusitis and nasal polyps. Cardiovascular:  Denies palpitations, chest pain and PND or orthopnea. Complains of chest tightness with SOB and wheezing. Respiration:  Denies hemoptysis, smoker and TB exposure. Complains of dyspnea at rest, dyspnea on exertion, increased productive cough with green sputum and wheezing. Gastrointestinal:Genitourinary:  Denies poor appetite, dysphagia, heartburn or reflux, abdominal pain, excessive gas or bloating, melena, hematochezia, constipation, diarrhea, dysuria, frequency/urgency, hematuria. Musculoskeletal: Denies myalgias, arthralgias, joint swelling, stiff joints and decreased range of motion. Breasts: Denies breast lump, breast tenderness and nipple discharge. Neurological: Denies dizzy/vertigo, headache, numbness/tingling, loss of consciousness. Psychological: Denies anxiety, depression. Endocrine:  Denies heat intolerance, cold intolerance and diabetic symptoms including neither polyuria nor polydipsia nor blurred vision nor neuropathy. Hematopoietic/lymphatic: Denies bleeding problems, bruising, jaundice and swollen lymph nodes. PHYSICAL EXAMINATION:   Vitals:    08/19/21 1525   BP: (!) 144/76   Pulse: 77   Resp: 18   SpO2: 99%   Weight: 111 lb (50.3 kg)   Height: 5' 4\" (1.626 m)     Constitutional: A thin, frail, short stature  27 y.o. female who is alert, oriented, cooperative and in no apparent distress. EENT: EOMI, right lazy eye, DURAN. MMM. No discharge. Midline, mucosa was without erythema, exudates or cobblestoning. No thrush. Left nasal polyp. TMJ laxity and clicking. Micronathia. Neck: Supple without thyromegaly.  No elevated JVP. Trachea was midline. No carotid bruits. Respiratory: Asymmetrical without dullness to percussion. Harsh crackles with faint exp wheezing bilateral mid lung fields. Opening snaps but no rhonchi or rales. Cardiovascular: RRR without murmur, clicks, gallops or rubs. No left or right ventricular heave. Pulses:  Carotid, radial and femoral pulses were equal bilaterally. Abdomen: Scaphoid, soft without organomegaly. No rebound, rigidity. Lymphatic: No lymphadenopathy. Musculoskeletal: Ambulates without assistance. Scoliotic/Kyphotic curvature of the spine. No involuntary movements. Extremities:  No lower extremity edema or erythema. Deep tendon reflexes are normal.   Skin:  Warm and dry. Color, turgor and pigmentation was relatively normal. No bruises or skin rashes. Neurological/Psychiatric: Thought content is normal. The patient's emotional status is normal.  Cranial nerves II-XII are intact. DATA:   The data collected below information that was obtained, reviewed, analyzed and interpreted today. PRE OP CHEST FILM:           CT SCAN CHEST 2014:  Bronchiectasis particularly into the lower lobes of the lingula with retained bronchial secretions. Signs of diffuse bronchiolitis particularly into the lower lobes and discrete peribronchial opacities some with fingerlike extension particularly into the left upper lobe with the appearance of mucous plugging. Correlate for atypical infection to include fungal etiologies. Bands of atelectasis with bronchial retraction across the medial aspect of the right lower lobe, medial aspect of the left upper lobe and medial aspect of the right middle lobe with associated air cysts across the medial aspect of the left upper lobe. Tracheal versus Zenker's diverticulum.       Todays Office Spirometry was compared to previous test if available and demonstrates an FVC of 2.13 liters which is 56 % of predicted with an FEV1 of 1.45 liters which is 44 % of predicted. FEV1/FVC ratio is 68 %. Post bronchodilator FEV1 is 1.50 liters which is 48% predicted. Mid expiratory flow rates are 29 % of predicted. Maximum voluntary ventilation is 61 liters per minute or 57 % of predicted. Flow volume loop shows no signs of intrathoracic or extrathoracic process. Impression: Moderate Airflow limitations, unchanged. Static lung volumes [7/17/2014] demonstrate an ERV 0.72 liters which is 49% predicted, TGV of 3.08 liters which is 112% predicted. Her RV is 2.36 which is 187% predicted, TLC is 4.91 liters which is 97% predicted. Her RV/TLC ratio is 200% predicted. DLCO is 74% predicted but corrects to normal at 98% when corrected for alveolar ventilation. Patients Nitric Oxide level today: 7 ppb   A low Yeison (less than 25) in adults implies non eosinophilic airway inflammation or absence of airway inflammation. A high Yeison (greater than 50) in adults or a rising Yeison with a greater than 40% change from a previously stable level implies uncontrolled or deteriorating eosinophilic airway inflammation. NASAL BIOSPY: 2011: Nasal sinus contents, scrapings - Fragments of  benign respiratory epithelial lined mucosa with chronic sinusitis. Benign appearing attached bony tissue is also present. DEXA SCAN 2014: Impression: There is osteoporosis noted in the lumbar spine. There is no osteoporosis or osteopenia noted in the right or left femoral neck. Cystic Fibrosis Gene Profile:  Negative  Rheumatoid factor    Negative  ANTHONY     Negative   Anti Scl70    Negative  Anti Jo1    Negative  Anti SSA SSB   Negative  CF mutation panel   Negative  Ig E     14 IU    Asthma Control Test [ACT] is a 5 part questionnaire, which has a point value system that ranges from 1- 5. This test is a validated questionnaire to help determine asthma control. A total score of 19 or below indicates that the patients asthma may not be well controlled.  If used in conjunction with PFTs, the correlation of determining asthma control is even stronger. Today on testing the ACT was 19 out of max of 25. ASSESSMENT:  David Elizalde is a young woman with central bronchiectasis, sinusitis without organ inverses. She also has osteoporosis. With this in mind, we requested the following. PLAN: Comfort Avendano was last seen in the office 2 years ago. Today she is seen for preoperative assessment for sinus surgery. Nasal polyposis has progressed. This is despite anti-inflammatories and nasal steroids. Closely - however she has canceled appointments and it has been two years since the last visit. She is feeling ok but has not continued with any medications. Including the VEST (mucociliary) and Albuterol/3% saline mix to BID from TID. As mentioned above we are unsure of the final diagnosis but she clearly has sinusitis biopsy-proven and the bronchiectasis as defined by high resolution CT scan. Causes for bronchiectasis will now be further defined with additional blood work for vasculitis and connective tissue disease - Aspiration, HIV, and inflammatory bowel disease along with sprue are all causes of bronchiectasis. Patient did have an abnormal celiac sprue antibody ( TISSUE TRANSGLUTAMINASE IGA 39 ) but does not want a half a GI biopsy. The previous diagnosis of Kartagener syndrome was questioned - we reviewed the CT images and discussed the case with other pulmonary physicians and found out the CT was labeled inappropriately and the sinus inversus is not present. However it should be noted that primary ciliary dyskinesia syndrome of which Kartagener follows under in 50% of the cases no sinus inversus is found. Previous nasal biopsy done in 2011 we asked the pathologist to be review and do electron microscopy to see if they see the ciliary structure abnormality - they could not do anything with the old 2011 samples. Cleared her for surgery and started her back on Advair twice a day. Albuterol as needed.   And we had her undergo a chest x-ray which is shown above. In addition to that we asked her to get a vitamin D level. Of note we asked that the nasal biopsy sent for electron microscopy to rule out Kartagener's. Also Given her unclear diagnosis with possibility of Samter Syndrome and or PCD syndrome we started ICS and Singulair and asked her to avoid NSAIDs and ASA. Biopsy for EM by ENT to see if PCD is present. Vaccines were reviewed. Prevnar 13 was given. She never got the repeat DEXA scan (cost) and had osteoporosis in 2018 along with that she stopped the vitamin D medications. We will see her back in 4 months. Thank you for entrusting us to participate in [facility]. If you have any questions, please don't hesitate to call us at the [facility]. Sincerely,    Amrit Velasquez DO, MPH, Mayranne Garber, FACP  Director, [facility]  Professor of Internal Medicine  [address] [facility]  [address] [place] [address]    PREVIOUS : Given her CT scan showing bronchiectasis with significant bronchial wall thickening and mucoid impaction at the distal bronchioles, we recommend that she undergo bronchoscopy for tissue diagnosis and for analysis of sputum to further define her underlying organism such as Pseudomonas, Haemophilus and/or staph aureus and atypical mycobacteria. However the mother was concerned about putting her under anesthesia did outline the procedure risks benefits and alternatives - the family decided against the biopsy. With her young age and osteoporosis we did a secondary work up and found her to have a positive tissue transglutaminase Ig A level. We asked her to get a biopsy and they refused but she did started gluten-free diet given the recent positive sprue antibodies.

## 2021-08-19 NOTE — PROGRESS NOTES
Patient will be called with the blood work and chest xray. Patient given the prevnar 13 during office visit. Patient given a sample of trelegy and was cautioned to rinse her mouth out after each use. Patient to restart Advair twice a day after trelegy is done. A copy of the patients surgical clearance form will be sent to patient and surgeon's office. Patient was given a flutter valve during office visit and educated on how to use the device by Dr. Vicki Pandey.

## 2021-08-20 ENCOUNTER — HOSPITAL ENCOUNTER (OUTPATIENT)
Age: 31
Discharge: HOME | End: 2021-08-20
Payer: COMMERCIAL

## 2021-08-20 ENCOUNTER — HOSPITAL ENCOUNTER (OUTPATIENT)
Dept: GENERAL RADIOLOGY | Age: 31
Discharge: HOME | End: 2021-08-22
Payer: COMMERCIAL

## 2021-08-20 ENCOUNTER — HOSPITAL ENCOUNTER (OUTPATIENT)
Age: 31
Discharge: HOME | End: 2021-08-22
Payer: COMMERCIAL

## 2021-08-20 DIAGNOSIS — J33.9 NASAL POLYP: ICD-10-CM

## 2021-08-20 DIAGNOSIS — J47.9 BRONCHIECTASIS WITHOUT COMPLICATION (HCC): ICD-10-CM

## 2021-08-20 DIAGNOSIS — J45.909 UNCOMPLICATED ASTHMA, UNSPECIFIED ASTHMA SEVERITY, UNSPECIFIED WHETHER PERSISTENT: ICD-10-CM

## 2021-08-20 LAB
ALBUMIN SERPL-MCNC: 4.7 G/DL (ref 3.5–5.2)
ALP BLD-CCNC: 67 U/L (ref 35–104)
ALT SERPL-CCNC: 10 U/L (ref 0–32)
ANION GAP SERPL CALCULATED.3IONS-SCNC: 14 MMOL/L (ref 7–16)
AST SERPL-CCNC: 15 U/L (ref 0–31)
BILIRUB SERPL-MCNC: 0.3 MG/DL (ref 0–1.2)
BUN BLDV-MCNC: 12 MG/DL (ref 6–20)
CALCIUM SERPL-MCNC: 9.6 MG/DL (ref 8.6–10.2)
CHLORIDE BLD-SCNC: 102 MMOL/L (ref 98–107)
CO2: 23 MMOL/L (ref 22–29)
CREAT SERPL-MCNC: 0.6 MG/DL (ref 0.5–1)
GFR AFRICAN AMERICAN: >60
GFR NON-AFRICAN AMERICAN: >60 ML/MIN/1.73
GLUCOSE BLD-MCNC: 92 MG/DL (ref 74–99)
POTASSIUM SERPL-SCNC: 4 MMOL/L (ref 3.5–5)
SODIUM BLD-SCNC: 139 MMOL/L (ref 132–146)
TOTAL PROTEIN: 8.1 G/DL (ref 6.4–8.3)
VITAMIN D 25-HYDROXY: 7 NG/ML (ref 30–100)

## 2021-08-20 PROCEDURE — 80053 COMPREHEN METABOLIC PANEL: CPT

## 2021-08-20 PROCEDURE — 82784 ASSAY IGA/IGD/IGG/IGM EACH: CPT

## 2021-08-20 PROCEDURE — 71046 X-RAY EXAM CHEST 2 VIEWS: CPT

## 2021-08-20 PROCEDURE — 82306 VITAMIN D 25 HYDROXY: CPT

## 2021-08-20 PROCEDURE — 86255 FLUORESCENT ANTIBODY SCREEN: CPT

## 2021-08-20 RX ORDER — ERGOCALCIFEROL 1.25 MG/1
50000 CAPSULE ORAL WEEKLY
Qty: 12 CAPSULE | Refills: 1 | Status: SHIPPED | OUTPATIENT
Start: 2021-08-20

## 2021-08-21 LAB — IGG: 1411 MG/DL (ref 700–1600)

## 2021-08-22 RX ORDER — MONTELUKAST SODIUM 10 MG/1
10 TABLET ORAL NIGHTLY
Qty: 30 TABLET | Refills: 3 | Status: SHIPPED
Start: 2021-08-22 | End: 2022-02-21 | Stop reason: SDUPTHER

## 2021-08-25 LAB — ANCA IFA: NORMAL

## 2021-08-30 ENCOUNTER — TELEPHONE (OUTPATIENT)
Dept: ENT CLINIC | Age: 31
End: 2021-08-30

## 2021-08-30 NOTE — TELEPHONE ENCOUNTER
Received faxed request for an alternative to Flonase (that is not covered by insurance). Mometasone & flunisolide.

## 2021-09-09 NOTE — PROGRESS NOTES
Have you been tested for COVID  No     Vaccinated/epic      Have you been told you were positive for COVID No  Have you had any known exposure to someone that is positive for COVID No  Do you have a cough                   No              Do you have shortness of breath No                 Do you have a sore throat            No                Are you having chills                    No                Are you having muscle aches. No                    Please come to the hospital wearing a mask and have your significant other wear a mask as well. Both of you should check your temperature before leaving to come here,  if it is 100 or higher please call [phone] for instruction.

## 2021-09-14 ENCOUNTER — ANESTHESIA EVENT (OUTPATIENT)
Dept: OPERATING ROOM | Age: 31
End: 2021-09-14
Payer: COMMERCIAL

## 2021-09-14 ENCOUNTER — ANESTHESIA (OUTPATIENT)
Dept: OPERATING ROOM | Age: 31
End: 2021-09-14
Payer: COMMERCIAL

## 2021-09-14 ENCOUNTER — HOSPITAL ENCOUNTER (OUTPATIENT)
Age: 31
Setting detail: OUTPATIENT SURGERY
Discharge: HOME | End: 2021-09-14
Attending: OTOLARYNGOLOGY | Admitting: OTOLARYNGOLOGY
Payer: COMMERCIAL

## 2021-09-14 VITALS
DIASTOLIC BLOOD PRESSURE: 71 MMHG | BODY MASS INDEX: 19.12 KG/M2 | HEIGHT: 64 IN | SYSTOLIC BLOOD PRESSURE: 114 MMHG | HEART RATE: 105 BPM | TEMPERATURE: 98.4 F | OXYGEN SATURATION: 95 % | RESPIRATION RATE: 18 BRPM | WEIGHT: 112 LBS

## 2021-09-14 VITALS
RESPIRATION RATE: 12 BRPM | SYSTOLIC BLOOD PRESSURE: 141 MMHG | DIASTOLIC BLOOD PRESSURE: 70 MMHG | TEMPERATURE: 97.9 F | OXYGEN SATURATION: 99 %

## 2021-09-14 DIAGNOSIS — G89.18 POST-OP PAIN: Primary | ICD-10-CM

## 2021-09-14 LAB
HCG, URINE, POC: NEGATIVE
Lab: NORMAL
NEGATIVE QC PASS/FAIL: NORMAL
POSITIVE QC PASS/FAIL: NORMAL

## 2021-09-14 PROCEDURE — 6360000002 HC RX W HCPCS: Performed by: NURSE ANESTHETIST, CERTIFIED REGISTERED

## 2021-09-14 PROCEDURE — 30115 REMOVAL OF NOSE POLYP(S): CPT | Performed by: OTOLARYNGOLOGY

## 2021-09-14 PROCEDURE — C2625 STENT, NON-COR, TEM W/DEL SY: HCPCS | Performed by: OTOLARYNGOLOGY

## 2021-09-14 PROCEDURE — 2500000003 HC RX 250 WO HCPCS: Performed by: NURSE ANESTHETIST, CERTIFIED REGISTERED

## 2021-09-14 PROCEDURE — 3600000003 HC SURGERY LEVEL 3 BASE: Performed by: OTOLARYNGOLOGY

## 2021-09-14 PROCEDURE — 2720000010 HC SURG SUPPLY STERILE: Performed by: OTOLARYNGOLOGY

## 2021-09-14 PROCEDURE — 6370000000 HC RX 637 (ALT 250 FOR IP): Performed by: OTOLARYNGOLOGY

## 2021-09-14 PROCEDURE — 2709999900 HC NON-CHARGEABLE SUPPLY: Performed by: OTOLARYNGOLOGY

## 2021-09-14 PROCEDURE — 3700000001 HC ADD 15 MINUTES (ANESTHESIA): Performed by: OTOLARYNGOLOGY

## 2021-09-14 PROCEDURE — 2500000003 HC RX 250 WO HCPCS: Performed by: OTOLARYNGOLOGY

## 2021-09-14 PROCEDURE — 6370000000 HC RX 637 (ALT 250 FOR IP): Performed by: ANESTHESIOLOGY

## 2021-09-14 PROCEDURE — 7100000010 HC PHASE II RECOVERY - FIRST 15 MIN: Performed by: OTOLARYNGOLOGY

## 2021-09-14 PROCEDURE — 7100000001 HC PACU RECOVERY - ADDTL 15 MIN: Performed by: OTOLARYNGOLOGY

## 2021-09-14 PROCEDURE — 3700000000 HC ANESTHESIA ATTENDED CARE: Performed by: OTOLARYNGOLOGY

## 2021-09-14 PROCEDURE — 3600000013 HC SURGERY LEVEL 3 ADDTL 15MIN: Performed by: OTOLARYNGOLOGY

## 2021-09-14 PROCEDURE — 2780000010 HC IMPLANT OTHER: Performed by: OTOLARYNGOLOGY

## 2021-09-14 PROCEDURE — 2580000003 HC RX 258: Performed by: STUDENT IN AN ORGANIZED HEALTH CARE EDUCATION/TRAINING PROGRAM

## 2021-09-14 PROCEDURE — 7100000000 HC PACU RECOVERY - FIRST 15 MIN: Performed by: OTOLARYNGOLOGY

## 2021-09-14 PROCEDURE — 6360000002 HC RX W HCPCS: Performed by: ANESTHESIOLOGY

## 2021-09-14 PROCEDURE — 7100000011 HC PHASE II RECOVERY - ADDTL 15 MIN: Performed by: OTOLARYNGOLOGY

## 2021-09-14 DEVICE — PROPEL MINI SINUS IMPLANT
Type: IMPLANTABLE DEVICE | Site: NOSE | Status: FUNCTIONAL
Brand: PROPEL MINI

## 2021-09-14 RX ORDER — OXYCODONE HYDROCHLORIDE AND ACETAMINOPHEN 5; 325 MG/1; MG/1
1 TABLET ORAL
Status: DISCONTINUED | OUTPATIENT
Start: 2021-09-14 | End: 2021-09-14 | Stop reason: HOSPADM

## 2021-09-14 RX ORDER — GLYCOPYRROLATE 1 MG/5 ML
SYRINGE (ML) INTRAVENOUS PRN
Status: DISCONTINUED | OUTPATIENT
Start: 2021-09-14 | End: 2021-09-14 | Stop reason: SDUPTHER

## 2021-09-14 RX ORDER — SODIUM CHLORIDE 9 MG/ML
25 INJECTION, SOLUTION INTRAVENOUS PRN
Status: DISCONTINUED | OUTPATIENT
Start: 2021-09-14 | End: 2021-09-14 | Stop reason: HOSPADM

## 2021-09-14 RX ORDER — ONDANSETRON 4 MG/1
4 TABLET, ORALLY DISINTEGRATING ORAL EVERY 8 HOURS PRN
Qty: 10 TABLET | Refills: 1 | Status: SHIPPED | OUTPATIENT
Start: 2021-09-14

## 2021-09-14 RX ORDER — SODIUM CHLORIDE 0.9 % (FLUSH) 0.9 %
5-40 SYRINGE (ML) INJECTION EVERY 12 HOURS SCHEDULED
Status: DISCONTINUED | OUTPATIENT
Start: 2021-09-14 | End: 2021-09-14 | Stop reason: HOSPADM

## 2021-09-14 RX ORDER — FENTANYL CITRATE 50 UG/ML
INJECTION, SOLUTION INTRAMUSCULAR; INTRAVENOUS PRN
Status: DISCONTINUED | OUTPATIENT
Start: 2021-09-14 | End: 2021-09-14 | Stop reason: SDUPTHER

## 2021-09-14 RX ORDER — DIPHENHYDRAMINE HYDROCHLORIDE 50 MG/ML
12.5 INJECTION INTRAMUSCULAR; INTRAVENOUS
Status: DISCONTINUED | OUTPATIENT
Start: 2021-09-14 | End: 2021-09-14 | Stop reason: HOSPADM

## 2021-09-14 RX ORDER — SCOLOPAMINE TRANSDERMAL SYSTEM 1 MG/1
1 PATCH, EXTENDED RELEASE TRANSDERMAL ONCE
Status: DISCONTINUED | OUTPATIENT
Start: 2021-09-14 | End: 2021-09-14 | Stop reason: HOSPADM

## 2021-09-14 RX ORDER — ONDANSETRON 2 MG/ML
INJECTION INTRAMUSCULAR; INTRAVENOUS PRN
Status: DISCONTINUED | OUTPATIENT
Start: 2021-09-14 | End: 2021-09-14 | Stop reason: SDUPTHER

## 2021-09-14 RX ORDER — MEPERIDINE HYDROCHLORIDE 25 MG/ML
12.5 INJECTION INTRAMUSCULAR; INTRAVENOUS; SUBCUTANEOUS EVERY 5 MIN PRN
Status: DISCONTINUED | OUTPATIENT
Start: 2021-09-14 | End: 2021-09-14 | Stop reason: HOSPADM

## 2021-09-14 RX ORDER — FENTANYL CITRATE 50 UG/ML
25 INJECTION, SOLUTION INTRAMUSCULAR; INTRAVENOUS EVERY 5 MIN PRN
Status: DISCONTINUED | OUTPATIENT
Start: 2021-09-14 | End: 2021-09-14 | Stop reason: HOSPADM

## 2021-09-14 RX ORDER — OXYMETAZOLINE HYDROCHLORIDE 0.05 G/100ML
SPRAY NASAL PRN
Status: DISCONTINUED | OUTPATIENT
Start: 2021-09-14 | End: 2021-09-14 | Stop reason: ALTCHOICE

## 2021-09-14 RX ORDER — PROMETHAZINE HYDROCHLORIDE 25 MG/ML
6.25 INJECTION, SOLUTION INTRAMUSCULAR; INTRAVENOUS
Status: DISCONTINUED | OUTPATIENT
Start: 2021-09-14 | End: 2021-09-14 | Stop reason: HOSPADM

## 2021-09-14 RX ORDER — EPINEPHRINE NASAL SOLUTION 1 MG/ML
SOLUTION NASAL PRN
Status: DISCONTINUED | OUTPATIENT
Start: 2021-09-14 | End: 2021-09-14 | Stop reason: ALTCHOICE

## 2021-09-14 RX ORDER — LIDOCAINE HYDROCHLORIDE AND EPINEPHRINE 10; 10 MG/ML; UG/ML
INJECTION, SOLUTION INFILTRATION; PERINEURAL PRN
Status: DISCONTINUED | OUTPATIENT
Start: 2021-09-14 | End: 2021-09-14 | Stop reason: ALTCHOICE

## 2021-09-14 RX ORDER — MIDAZOLAM HYDROCHLORIDE 2 MG/2ML
1 INJECTION, SOLUTION INTRAMUSCULAR; INTRAVENOUS ONCE
Status: COMPLETED | OUTPATIENT
Start: 2021-09-14 | End: 2021-09-14

## 2021-09-14 RX ORDER — FENTANYL CITRATE 50 UG/ML
50 INJECTION, SOLUTION INTRAMUSCULAR; INTRAVENOUS EVERY 5 MIN PRN
Status: DISCONTINUED | OUTPATIENT
Start: 2021-09-14 | End: 2021-09-14 | Stop reason: HOSPADM

## 2021-09-14 RX ORDER — SODIUM CHLORIDE 0.9 % (FLUSH) 0.9 %
5-40 SYRINGE (ML) INJECTION PRN
Status: DISCONTINUED | OUTPATIENT
Start: 2021-09-14 | End: 2021-09-14 | Stop reason: HOSPADM

## 2021-09-14 RX ORDER — PROPOFOL 10 MG/ML
INJECTION, EMULSION INTRAVENOUS PRN
Status: DISCONTINUED | OUTPATIENT
Start: 2021-09-14 | End: 2021-09-14 | Stop reason: SDUPTHER

## 2021-09-14 RX ORDER — LIDOCAINE HYDROCHLORIDE 20 MG/ML
INJECTION, SOLUTION EPIDURAL; INFILTRATION; INTRACAUDAL; PERINEURAL PRN
Status: DISCONTINUED | OUTPATIENT
Start: 2021-09-14 | End: 2021-09-14 | Stop reason: SDUPTHER

## 2021-09-14 RX ORDER — NEOSTIGMINE METHYLSULFATE 1 MG/ML
INJECTION, SOLUTION INTRAVENOUS PRN
Status: DISCONTINUED | OUTPATIENT
Start: 2021-09-14 | End: 2021-09-14 | Stop reason: SDUPTHER

## 2021-09-14 RX ORDER — ROCURONIUM BROMIDE 10 MG/ML
INJECTION, SOLUTION INTRAVENOUS PRN
Status: DISCONTINUED | OUTPATIENT
Start: 2021-09-14 | End: 2021-09-14 | Stop reason: SDUPTHER

## 2021-09-14 RX ORDER — DEXAMETHASONE SODIUM PHOSPHATE 4 MG/ML
INJECTION, SOLUTION INTRA-ARTICULAR; INTRALESIONAL; INTRAMUSCULAR; INTRAVENOUS; SOFT TISSUE PRN
Status: DISCONTINUED | OUTPATIENT
Start: 2021-09-14 | End: 2021-09-14 | Stop reason: SDUPTHER

## 2021-09-14 RX ORDER — HYDROCODONE BITARTRATE AND ACETAMINOPHEN 5; 325 MG/1; MG/1
1 TABLET ORAL EVERY 6 HOURS PRN
Qty: 20 TABLET | Refills: 0 | Status: SHIPPED | OUTPATIENT
Start: 2021-09-14 | End: 2021-09-19

## 2021-09-14 RX ADMIN — ONDANSETRON 4 MG: 2 INJECTION INTRAMUSCULAR; INTRAVENOUS at 14:28

## 2021-09-14 RX ADMIN — FENTANYL CITRATE 50 MCG: 50 INJECTION, SOLUTION INTRAMUSCULAR; INTRAVENOUS at 14:29

## 2021-09-14 RX ADMIN — FENTANYL CITRATE 25 MCG: 50 INJECTION, SOLUTION INTRAMUSCULAR; INTRAVENOUS at 15:40

## 2021-09-14 RX ADMIN — DEXAMETHASONE SODIUM PHOSPHATE 8 MG: 4 INJECTION, SOLUTION INTRAMUSCULAR; INTRAVENOUS at 14:29

## 2021-09-14 RX ADMIN — LIDOCAINE HYDROCHLORIDE 60 MG: 20 INJECTION, SOLUTION EPIDURAL; INFILTRATION; INTRACAUDAL; PERINEURAL at 14:26

## 2021-09-14 RX ADMIN — ROCURONIUM BROMIDE 30 MG: 10 INJECTION, SOLUTION INTRAVENOUS at 14:26

## 2021-09-14 RX ADMIN — PROPOFOL 180 MG: 10 INJECTION, EMULSION INTRAVENOUS at 14:26

## 2021-09-14 RX ADMIN — FENTANYL CITRATE 50 MCG: 50 INJECTION, SOLUTION INTRAMUSCULAR; INTRAVENOUS at 15:14

## 2021-09-14 RX ADMIN — FENTANYL CITRATE 25 MCG: 50 INJECTION, SOLUTION INTRAMUSCULAR; INTRAVENOUS at 15:32

## 2021-09-14 RX ADMIN — SODIUM CHLORIDE: 9 INJECTION, SOLUTION INTRAVENOUS at 14:20

## 2021-09-14 RX ADMIN — Medication 3 MG: at 15:15

## 2021-09-14 RX ADMIN — FENTANYL CITRATE 50 MCG: 50 INJECTION, SOLUTION INTRAMUSCULAR; INTRAVENOUS at 14:26

## 2021-09-14 RX ADMIN — MIDAZOLAM HYDROCHLORIDE 1 MG: 1 INJECTION, SOLUTION INTRAMUSCULAR; INTRAVENOUS at 14:16

## 2021-09-14 RX ADMIN — Medication 0.6 MG: at 15:15

## 2021-09-14 ASSESSMENT — PULMONARY FUNCTION TESTS
PIF_VALUE: 19
PIF_VALUE: 25
PIF_VALUE: 19
PIF_VALUE: 23
PIF_VALUE: 20
PIF_VALUE: 19
PIF_VALUE: 26
PIF_VALUE: 25
PIF_VALUE: 19
PIF_VALUE: 6
PIF_VALUE: 19
PIF_VALUE: 21
PIF_VALUE: 20
PIF_VALUE: 19
PIF_VALUE: 2
PIF_VALUE: 19
PIF_VALUE: 21
PIF_VALUE: 4
PIF_VALUE: 21
PIF_VALUE: 19
PIF_VALUE: 19
PIF_VALUE: 3
PIF_VALUE: 19
PIF_VALUE: 22
PIF_VALUE: 19
PIF_VALUE: 23
PIF_VALUE: 19
PIF_VALUE: 1
PIF_VALUE: 19
PIF_VALUE: 21
PIF_VALUE: 19
PIF_VALUE: 1
PIF_VALUE: 3
PIF_VALUE: 18
PIF_VALUE: 9
PIF_VALUE: 4
PIF_VALUE: 19
PIF_VALUE: 22
PIF_VALUE: 2
PIF_VALUE: 1
PIF_VALUE: 21
PIF_VALUE: 2
PIF_VALUE: 20
PIF_VALUE: 4
PIF_VALUE: 21
PIF_VALUE: 18
PIF_VALUE: 19
PIF_VALUE: 24
PIF_VALUE: 2
PIF_VALUE: 1
PIF_VALUE: 19
PIF_VALUE: 7
PIF_VALUE: 19
PIF_VALUE: 2

## 2021-09-14 ASSESSMENT — LIFESTYLE VARIABLES: SMOKING_STATUS: 0

## 2021-09-14 ASSESSMENT — PAIN SCALES - GENERAL
PAINLEVEL_OUTOF10: 0
PAINLEVEL_OUTOF10: 3

## 2021-09-14 ASSESSMENT — PAIN - FUNCTIONAL ASSESSMENT: PAIN_FUNCTIONAL_ASSESSMENT: 0-10

## 2021-09-14 NOTE — PROGRESS NOTES
CLINICAL PHARMACY NOTE: MEDS TO BEDS    Total # of Prescriptions Filled: 2   The following medications were delivered to the patient:  · Ondansetron 4 mg  · norco 5-325 mg    Additional Documentation:

## 2021-09-14 NOTE — H&P
[id number] [facility] Otolaryngology  Dr. Anselmo Barclay. Tai Mckeon D.O. Ms.Ed           Patient Name:  Becca Diaz  :  1990      CHIEF C/O:         Chief Complaint   Patient presents with    Follow-up       allergy- poss sx         HISTORY OBTAINED FROM:  patient     HISTORY OF PRESENT ILLNESS:       Jill Eubanks is a 31y.o. year old female, patient returns for 3 month follow up of allergies. She continues using Flonase and Astelin with good relief of symptoms, however she continues to have nasal obstruction and facial pain/pressure.      21: here today for follow up of chronic allergic rhinitis and seasonal allergies with associated nasal polyposis. Patient states she been taking Flonase and Astelin, she has increasing left-sided fullness and pressure without associated vertigo. No current complaints of hoarseness shortness of breath or stridor. No other complaints of headache or vision changes.   No current complaints of recent epistaxis, no current or recent complaints of recurrent sinusitis requiring antibiotic therapy.        Past Medical History        Past Medical History:   Diagnosis Date    Asthma      Bronchiectasis (HCC)      Nasal polyp      Osteoporosis      Osteoporosis 2014    Premature baby      Scoliosis 3/9/2015    Seasonal allergies      TMJ (temporomandibular joint disorder)      Vitamin D deficiency      Vitamin D deficiency 2014         Past Surgical History         Past Surgical History:   Procedure Laterality Date    PFT-LAB   2014    SINUS SURGERY               Current Medication      Current Outpatient Medications:     azelastine (ASTELIN) 0.1 % nasal spray, USE 2 SPRAYS IN EACH NOSTRIL TWICE DAILY AS DIRECTED, Disp: 30 mL, Rfl: 3    fluticasone (FLONASE) 50 MCG/ACT nasal spray, SHAKE LIQUID AND USE 2 SPRAYS IN EACH NOSTRIL EVERY DAY, Disp: 16 g, Rfl: 3    fluticasone (FLONASE) 50 MCG/ACT nasal spray, SHAKE LIQUID AND USE 2 SPRAYS IN EACH NOSTRIL DAILY, Disp: 16 g, Rfl: 0    montelukast (SINGULAIR) 10 MG tablet, Take 1 tablet by mouth daily, Disp: 30 tablet, Rfl: 3    albuterol (PROVENTIL) (2.5 MG/3ML) 0.083% nebulizer solution, Take 3 mLs by nebulization every 6 hours as needed for Wheezing, Disp: 900 mL, Rfl: 5    azelastine (ASTELIN) 0.1 % nasal spray, USE 2 SPRAYS IN EACH NOSTRIL TWICE DAILY AS DIRECTED, Disp: 30 mL, Rfl: 1    ciprofloxacin (CILOXAN) 0.3 % ophthalmic solution, 3 drops each ear twice a day for 3 days, Disp: 1 Bottle, Rfl: 0    montelukast (SINGULAIR) 10 MG tablet, Take 1 tablet by mouth daily, Disp: 30 tablet, Rfl: 3    fexofenadine (ALLEGRA) 180 MG tablet, Take 1 tablet by mouth daily, Disp: 90 tablet, Rfl: 3    albuterol sulfate  (90 Base) MCG/ACT inhaler, INHALE 2 PUFFS INTO THE LUNGS EVERY 6 HOURS AS NEEDED FOR WHEEZING, Disp: 18 g, Rfl: 0    albuterol sulfate  (90 Base) MCG/ACT inhaler, INHALE 2 PUFFS INTO THE LUNGS EVERY 6 HOURS AS NEEDED FOR WHEEZING, Disp: 18 g, Rfl: 0    cetirizine (ZYRTEC) 10 MG tablet, TAKE 1 TABLET BY MOUTH EVERY DAY, Disp: 90 tablet, Rfl: 0    predniSONE (DELTASONE) 10 MG tablet, 40mg x 3 days, 30mg x 3 days, 20mg x 3 days, 10mg x 3 days then stop., Disp: 10 tablet, Rfl: 0    mometasone-formoterol (DULERA) 200-5 MCG/ACT inhaler, Inhale 2 puffs into the lungs 2 times daily Rinse mouth after using., Disp: 1 Inhaler, Rfl: 12    Nebulizers (PORTABLE COMPRESSOR NEBULIZER) MISC, Reason: Severe asthma and bronchiectasis, Disp: 1 each, Rfl: 0    ondansetron (ZOFRAN) 4 MG tablet, Take 1 tablet by mouth every 8 hours as needed for Nausea or Vomiting, Disp: 15 tablet, Rfl: 0    guaiFENesin (MUCINEX) 600 MG extended release tablet, Take 2 tablets by mouth 2 times daily as needed for Congestion, Disp: , Rfl:     DULERA 100-5 MCG/ACT inhaler, INHALE 2 PUFFS BY MOUTH TWICE DAILY, Disp: 13 g, Rfl: 0    Nebulizers (VIOS AEROSOL DELIVERY SYSTEM) MISC, USE AS DIRECTED, Disp: 1 each, Rfl: 0    Calcium Carbonate-Vitamin D (OYSTER SHELL CALCIUM/D) 500-200 MG-UNIT TABS, Take 1 tablet by mouth daily. , Disp: 30 tablet, Rfl: 0    Montelukast Sodium (SINGULAIR PO), Take  by mouth., Disp: , Rfl:     Fluticasone Propionate (FLONASE NA), by Nasal route., Disp: , Rfl:      Patient has no known allergies. Social History           Tobacco Use    Smoking status: Never Smoker    Smokeless tobacco: Never Used   Substance Use Topics    Alcohol use: No    Drug use: No      Family History         Family History   Problem Relation Age of Onset    Asthma Sister      Asthma Sister              Review of Systems   Constitutional: Negative for chills and fever. HENT: Positive for congestion, postnasal drip, rhinorrhea, sinus pressure and sinus pain. Negative for ear discharge, hearing loss, sore throat, tinnitus and voice change. Respiratory: Negative for cough and shortness of breath. Cardiovascular: Negative for chest pain and palpitations. Gastrointestinal: Negative for vomiting. Skin: Negative for rash. Allergic/Immunologic: Negative for environmental allergies. Neurological: Negative for dizziness and headaches. Hematological: Does not bruise/bleed easily. All other systems reviewed and are negative.        BP (!) 137/93   Pulse 118   Wt 112 lb (50.8 kg)   BMI 19.22 kg/m²   Physical Exam  Vitals signs and nursing note reviewed. Constitutional:       Appearance: She is well-developed. HENT:      Head: Normocephalic and atraumatic. Right Ear: Tympanic membrane and ear canal normal.      Left Ear: Tympanic membrane and ear canal normal.      Nose: Congestion and rhinorrhea present. Mouth/Throat:      Mouth: Mucous membranes are moist.      Pharynx: Oropharynx is clear. Eyes:      Pupils: Pupils are equal, round, and reactive to light. Neck:      Musculoskeletal: Normal range of motion. Thyroid: No thyromegaly. Trachea: No tracheal deviation. Cardiovascular:      Rate and Rhythm: Normal rate. Pulmonary:      Effort: Pulmonary effort is normal. No respiratory distress. Musculoskeletal: Normal range of motion. Lymphadenopathy:      Cervical: No cervical adenopathy. Skin:     General: Skin is warm. Findings: No erythema. Neurological:      Mental Status: She is alert. Cranial Nerves: No cranial nerve deficit.          IMPRESSION/PLAN:  Patient seen and examined for history of chronic allergic rhinitis postnasal drainage and seasonal allergies with associated nasal polyposis. We discussed revision sinus surgery vs in office placement of Sinuva implant. We recommend revision FESS as best option and will schedule patient for this. Risks, benefits and alternatives were explained to her with risks including but not limited to bleeding, infection, scarring, fluid collection, damage to surrounding structures and need for further procedures. Patient is to follow up one week after surgery.      Patient seen, examined and case discussed with Dr. Tai Mckeon     Electronically signed by Nancy Adames DO on 4/26/2021 at 9:10 AM     Dr. Cotlen Serrano.  Otolaryngology Facial Plastic Surgery  :  [facility]  Associate Clinical Professor:  [employer]             Becca Joe  1990        I have discussed the case, including pertinent history and exam findings with the resident. I have seen and examined the patient and the key elements of the encounter have been performed by me.   I agree with the assessment, plan and orders as documented by the resident.       Patient here for follow up of medical problems.            Remainder of medical problems as per resident note.        LENORE TOMAS [id number] [facility],   5/20/21

## 2021-09-14 NOTE — OP NOTE
Operative Note      Patient: Franklyn Cruz  YOB: 1990  MRN: [id number]    Date of Procedure: 9/14/2021    Pre-Op Diagnosis: CHRONIC POLYPOSIS    Post-Op Diagnosis: Same       Procedure(s):  REVISION FUNCTIONAL ENDOSCOPIC SINUS SURGERY    Surgeon(s):  Megan Clayton DO    Assistant:   Resident: Afshan Estrada DO; Robert Hargrove PGY-1    Anesthesia: General    Estimated Blood Loss (mL): less than 50     Complications: None    Specimens:   * No specimens in log *    Implants:  Implant Name Type Inv. Item Serial No.  Lot No. LRB No. Used Action   STENT NSL L16MM DIA4MM 370UG MINI MOMETASONE FUROATE LO  STENT NSL L16MM DIA4MM 370UG MINI MOMETASONE FUROATE LO  [employer]- [id number] N/A 1 Implanted   STENT NSL L16MM DIA4MM 370UG MINI MOMETASONE FUROATE LO  STENT NSL L16MM DIA4MM 370UG MINI MOMETASONE FUROATE LO  [employer]- [id number] N/A 1 Implanted         Drains: * No LDAs found *    Findings: nasal polyposis left middle meatus- resected with micro debrider; two Propel Mini drug eluting stents placed in middle meatus      Detailed Description of Procedure: Indications: Franklyn Cruz is a 27 y.o. female who presents for sinus surgery due to chronic sinus illness with failure of medical management; left nasal polyposis       Prep  The patient was consented preoperatively and taken back to the operating room, identified appropriately, placed in the supine position, given anesthesia for general intubation. The patient was intubated. They were prepped and draped in a sterile fashion. Initial prep for the nose was 4% cocaine pledgets placed into both nasal cavities and the patient's nasal walls medial and lateral along the septal line and then along the inferior turbinate were injected with approximately 5mL of 1% lidocaine with epinephrine. That was total for both sides. The pledgets were allowed to sit for approximately 5 minutes, while the rest of the patient's prep was done.

## 2021-09-14 NOTE — H&P
Abrahan Gunn was seen and re-examined preoperatively today, September 14, 2021. There was no substantial change in her physical and medical status. Patient is fit for the proposed surgical procedure. All questions were appropriately addressed and had no further questions regarding the risks, benefits, and alternatives of the procedure. Abrahan Gunn and family wished to proceed.     Mitra Hui DO  Resident Physician  [facility])  Otolaryngology Residency  9/14/2021  11:52 AM

## 2021-09-14 NOTE — ANESTHESIA PRE PROCEDURE
Department of Anesthesiology  Preprocedure Note       Name:  Jorge Sandifer   Age:  27 y.o.  :  1990                                          MRN:  [id number]         Date:  2021      Surgeon: Rosalva Benjamin):  Dixie Carroll DO    Procedure: Procedure(s):  REVISION FUNCTIONAL ENDOSCOPIC SINUS SURGERY    ++NEEDS NAVIGATION++    Medications prior to admission:   Prior to Admission medications    Medication Sig Start Date End Date Taking?  Authorizing Provider   montelukast (SINGULAIR) 10 MG tablet Take 1 tablet by mouth nightly 21  Yes Jim Zelaya DO   vitamin D (ERGOCALCIFEROL) 1.25 MG (43039 UT) CAPS capsule Take 1 capsule by mouth once a week 21  Yes Jim Zelaya DO   fluticasone-salmeterol (ADVAIR DISKUS) 500-50 MCG/DOSE diskus inhaler Inhale 1 puff into the lungs 2 times daily Rinse mouth after use. 21  Yes Yosvany Zelaya DO   albuterol sulfate HFA (PROAIR HFA) 108 (90 Base) MCG/ACT inhaler Inhale 2 puffs into the lungs every 4 hours as needed for Wheezing or Shortness of Breath 21 Yes Yosvany Zelaya DO   ALLERGY RELIEF 180 MG tablet TAKE 1 TABLET BY MOUTH EVERY DAY 21  Yes Kaiden Aguirre DO   azelastine (ASTELIN) 0.1 % nasal spray USE 2 SPRAYS IN EACH NOSTRIL TWICE DAILY AS DIRECTED 21  Yes Kaiden Aguirre DO   montelukast (SINGULAIR) 10 MG tablet Take 1 tablet by mouth daily 21  Yes Kaiden Aguirre DO   azelastine (ASTELIN) 0.1 % nasal spray USE 2 SPRAYS IN EACH NOSTRIL TWICE DAILY AS DIRECTED 3/25/21  Yes Kaiden Aguirre DO   fluticasone (FLONASE) 50 MCG/ACT nasal spray SHAKE LIQUID AND USE 2 SPRAYS IN EACH NOSTRIL EVERY DAY 21  Yes Kaiden Aguirre DO   albuterol (PROVENTIL) (2.5 MG/3ML) 0.083% nebulizer solution Take 3 mLs by nebulization every 6 hours as needed for Wheezing 20  Yes Yosvany Zelaya, DO   montelukast (SINGULAIR) 10 MG tablet Take 1 tablet by mouth daily 8/3/20  Yes Seymour Jimenez,    cetirizine (ZYRTEC) 10 MG tablet TAKE 1 TABLET BY MOUTH EVERY DAY 3/11/19  Yes Yosvany Zelaya DO   mometasone-formoterol (DULERA) 200-5 MCG/ACT inhaler Inhale 2 puffs into the lungs 2 times daily Rinse mouth after using. 7/2/18  Yes Geovanna House DO   Nebulizers (PORTABLE COMPRESSOR NEBULIZER) MISC Reason: Severe asthma and bronchiectasis 7/2/18  Yes Janneth Zelaya DO   Nebulizers (VIOS AEROSOL DELIVERY SYSTEM) MISC USE AS DIRECTED 3/8/15  Yes Janneth Zelaya DO   Calcium Carbonate-Vitamin D (OYSTER SHELL CALCIUM/D) 500-200 MG-UNIT TABS Take 1 tablet by mouth daily. 10/10/14  Yes GODWIN Tapia - CNP   Fluticasone Propionate (FLONASE NA) by Nasal route.    Yes Historical Provider, MD       Current medications:    Current Facility-Administered Medications   Medication Dose Route Frequency Provider Last Rate Last Admin    sodium chloride flush 0.9 % injection 5-40 mL  5-40 mL IntraVENous 2 times per day Setphanie Arreola DO        sodium chloride flush 0.9 % injection 5-40 mL  5-40 mL IntraVENous PRN Stephanie Arreola DO        0.9 % sodium chloride infusion  25 mL IntraVENous PRN Stephanie Arreola DO           Allergies:  No Known Allergies    Problem List:    Patient Active Problem List   Diagnosis Code    Nasal polyp J33.9    Bronchiectasis ([facility]) J47.9    Asthma J45.909    Osteoporosis M81.0    Vitamin D deficiency E55.9    Scoliosis M41.9       Past Medical History:        Diagnosis Date    Asthma     follows with Dr Nilsa Montero [facility]ctasis [facility])     Nasal polyp     Osteoporosis     Osteoporosis 12/8/2014    Premature baby     Scoliosis 3/9/2015    Seasonal allergies     TMJ (temporomandibular joint disorder)     Vitamin D deficiency     Vitamin D deficiency 12/8/2014       Past Surgical History:        Procedure Laterality Date    PFT-LAB  7/18/2014    SINUS SURGERY         Social History:    Social History     Tobacco Use    Smoking status: Never Smoker    Smokeless tobacco: Never Used   Substance Use Topics    Alcohol use: No                                Counseling given: Not Answered      Vital Signs (Current):   Vitals:    09/09/21 1545 09/14/21 1150   BP:  131/76   Pulse:  102   Resp:  16   Temp:  36.5 °C (97.7 °F)   TempSrc:  Skin   SpO2:  97%   Weight: 112 lb (50.8 kg) 112 lb (50.8 kg)   Height: 5' 4\" (1.626 m) 5' 4\" (1.626 m)                                              BP Readings from Last 3 Encounters:   09/14/21 131/76   08/19/21 (!) 144/76   04/26/21 (!) 137/93       NPO Status: Time of last liquid consumption: 2000                        Time of last solid consumption: 2000                        Date of last liquid consumption: 09/13/21                        Date of last solid food consumption: 09/13/21    BMI:   Wt Readings from Last 3 Encounters:   09/14/21 112 lb (50.8 kg)   08/19/21 111 lb (50.3 kg)   04/26/21 112 lb (50.8 kg)     Body mass index is 19.22 kg/m². CBC:   Lab Results   Component Value Date    WBC 14.5 04/23/2011    RBC 4.66 04/23/2011    HGB 13.2 04/23/2011    HCT 40.8 04/23/2011    MCV 87.6 04/23/2011    RDW 16.4 04/23/2011     04/23/2011       CMP:   Lab Results   Component Value Date     08/20/2021    K 4.0 08/20/2021     08/20/2021    CO2 23 08/20/2021    BUN 12 08/20/2021    CREATININE 0.6 08/20/2021    GFRAA >60 08/20/2021    LABGLOM >60 08/20/2021    GLUCOSE 92 08/20/2021    PROT 8.1 08/20/2021    CALCIUM 9.6 08/20/2021    BILITOT 0.3 08/20/2021    ALKPHOS 67 08/20/2021    AST 15 08/20/2021    ALT 10 08/20/2021       POC Tests: No results for input(s): POCGLU, POCNA, POCK, POCCL, POCBUN, POCHEMO, POCHCT in the last 72 hours.     Coags: No results found for: PROTIME, INR, APTT    HCG (If Applicable):   Lab Results   Component Value Date    PREGSERUM NEGATIVE 04/23/2011        ABGs: No results found for: PHART, PO2ART, OWT6NHV, YDD2KPH, BEART, L6KOABGY     Type & Screen (If Applicable):  No results found for: LABABO, [address]    Drug/Infectious Status (If Applicable):  No results found for: HIV, HEPCAB    COVID-19 Screening (If Applicable): No results found for: COVID19    Chest Xray 8/20/21  FINDINGS:   There is focal opacity in the right middle lobe.  The left lung is clear. The heart is not enlarged.  There is no pneumothorax.  The costophrenic   angles are clear. CT Sinus 8/12/2020  Findings: The maxillary sinuses are partially opacified with mucosal thickening   bilaterally slightly worse on the right there is sequela of previous   surgery       The ethmoid sinuses are mostly pacified with the exception of the   regions of prior surgery.       The frontal sinuses on the left is opacified the right frontal sinus   is unremarkable       The sphenoid sinus is mostly opacified with a small region of aerated   sinus           Anesthesia Evaluation  Patient summary reviewed and Nursing notes reviewed no history of anesthetic complications:   Airway: Mallampati: II  TM distance: <3 FB     Mouth opening: > = 3 FB Dental:      Comment: Denies loose, missing or chipped teeth    Pulmonary: breath sounds clear to auscultation  (+) asthma ( last used albuterol about a week ago):     (-) not a current smoker          Patient did not smoke on day of surgery. Cardiovascular:Negative CV ROS  Exercise tolerance: good (>4 METS),         ECG reviewed  Rhythm: regular  Rate: normal                    Neuro/Psych:                ROS comment: Scoliosis GI/Hepatic/Renal: Neg GI/Hepatic/Renal ROS            Endo/Other:    (+) electrolyte abnormalities ( Vitamin D deficiency), .                  ROS comment: Osteoporosis; gets TMJ (temporomandibular joint disorder) sometimes but none currently; premature baby Abdominal:             Vascular: negative vascular ROS. Other Findings:           Anesthesia Plan      general     ASA 2       Induction: intravenous.     MIPS: Postoperative opioids intended and Prophylactic antiemetics administered. Anesthetic plan and risks discussed with patient. Use of blood products discussed with patient whom consented to blood products. Plan discussed with CRNA and attending. Roberto Dunaway RN   9/14/2021      Agree with above assessment. Physical exam unchanged. Spoke to patient about anesthetic plan. Will place scopolamine patch preop to help for prophylaxis against postop nausea and vomiting. Patient understands and wishes to proceed.

## 2021-09-15 NOTE — ANESTHESIA POSTPROCEDURE EVALUATION
Department of Anesthesiology  Postprocedure Note    Patient: Stuart Saba  MRN: [id number]  YOB: 1990  Date of evaluation: 9/14/2021  Time:  9:14 PM     Procedure Summary     Date: 09/14/21 Room / Location: [facility] OR [address] [place] [address]    Anesthesia Start: 8324 Anesthesia Stop: 0775    Procedure: REVISION FUNCTIONAL ENDOSCOPIC SINUS SURGERY (N/A ) Diagnosis: (CHRONIC POLYPOSIS)    Surgeons: Samantha Munoz DO Responsible Provider: Reggie Trujillo MD    Anesthesia Type: general ASA Status: 2          Anesthesia Type: general    Jaida Phase I: Jaida Score: 10    Jaida Phase II: Jaida Score: 10    Last vitals: Reviewed and per EMR flowsheets.        Anesthesia Post Evaluation    Patient location during evaluation: PACU  Patient participation: complete - patient participated  Level of consciousness: awake  Airway patency: patent  Nausea & Vomiting: no vomiting and no nausea  Complications: no  Cardiovascular status: hemodynamically stable  Respiratory status: acceptable  Hydration status: stable

## 2021-09-20 ENCOUNTER — OFFICE VISIT (OUTPATIENT)
Dept: ENT CLINIC | Age: 31
End: 2021-09-20

## 2021-09-20 VITALS
WEIGHT: 112 LBS | SYSTOLIC BLOOD PRESSURE: 125 MMHG | DIASTOLIC BLOOD PRESSURE: 86 MMHG | HEART RATE: 118 BPM | BODY MASS INDEX: 19.22 KG/M2

## 2021-09-20 DIAGNOSIS — R09.81 NASAL CONGESTION: Primary | ICD-10-CM

## 2021-09-20 PROCEDURE — 99024 POSTOP FOLLOW-UP VISIT: CPT | Performed by: OTOLARYNGOLOGY

## 2021-09-30 ENCOUNTER — TELEPHONE (OUTPATIENT)
Dept: ENT CLINIC | Age: 31
End: 2021-09-30

## 2021-09-30 ASSESSMENT — ENCOUNTER SYMPTOMS
COUGH: 0
SINUS PAIN: 0
SHORTNESS OF BREATH: 0
VOICE CHANGE: 0
TROUBLE SWALLOWING: 0
RHINORRHEA: 0
VOMITING: 0

## 2021-09-30 NOTE — PROGRESS NOTES
[facility] Otolaryngology  Dr. Monika Zamarripa. Tayla Pelayo. Ms.Ed        Patient Name:  Mavis Cuellar  :  1990     CHIEF C/O:    Chief Complaint   Patient presents with    Post-Op Check     FESS revision doing well        HISTORY OBTAINED FROM:  patient    HISTORY OF PRESENT ILLNESS:       Melia Saha is a 27y.o. year old female, here today for follow up of history of revision of bilateral functional scopic sinus pressure for history nasal polyposis, patient is doing well no complaints of vision changes headaches sore throat fever chills or epistaxis.       Past Medical History:   Diagnosis Date    Asthma     follows with Dr Kathleen Lal Bronchiectasis [facility])     Nasal polyp     Osteoporosis     Osteoporosis 2014    Premature baby     Scoliosis 3/9/2015    Seasonal allergies     TMJ (temporomandibular joint disorder)     Vitamin D deficiency     Vitamin D deficiency 2014     Past Surgical History:   Procedure Laterality Date    PFT-LAB  2014    SINUS ENDOSCOPY N/A 2021    REVISION FUNCTIONAL ENDOSCOPIC SINUS SURGERY performed by Nicole De Santiago DO at [facility] OR    SINUS SURGERY         Current Outpatient Medications:     ondansetron (ZOFRAN ODT) 4 MG disintegrating tablet, Place 1 tablet under the tongue every 8 hours as needed for Nausea or Vomiting, Disp: 10 tablet, Rfl: 1    montelukast (SINGULAIR) 10 MG tablet, Take 1 tablet by mouth nightly, Disp: 30 tablet, Rfl: 3    vitamin D (ERGOCALCIFEROL) 1.25 MG (73831 UT) CAPS capsule, Take 1 capsule by mouth once a week, Disp: 12 capsule, Rfl: 1    fluticasone-salmeterol (ADVAIR DISKUS) 500-50 MCG/DOSE diskus inhaler, Inhale 1 puff into the lungs 2 times daily Rinse mouth after use., Disp: 1 Inhaler, Rfl: 12    ALLERGY RELIEF 180 MG tablet, TAKE 1 TABLET BY MOUTH EVERY DAY, Disp: 90 tablet, Rfl: 3    montelukast (SINGULAIR) 10 MG tablet, Take 1 tablet by mouth daily, Disp: 30 tablet, Rfl: 3    albuterol (PROVENTIL) (2.5 MG/3ML) 0.083% nebulizer solution, Take 3 mLs by nebulization every 6 hours as needed for Wheezing, Disp: 900 mL, Rfl: 5    montelukast (SINGULAIR) 10 MG tablet, Take 1 tablet by mouth daily, Disp: 30 tablet, Rfl: 3    cetirizine (ZYRTEC) 10 MG tablet, TAKE 1 TABLET BY MOUTH EVERY DAY, Disp: 90 tablet, Rfl: 0    mometasone-formoterol (DULERA) 200-5 MCG/ACT inhaler, Inhale 2 puffs into the lungs 2 times daily Rinse mouth after using., Disp: 1 Inhaler, Rfl: 12    Nebulizers (PORTABLE COMPRESSOR NEBULIZER) MISC, Reason: Severe asthma and bronchiectasis, Disp: 1 each, Rfl: 0    Nebulizers (VIOS AEROSOL DELIVERY SYSTEM) MISC, USE AS DIRECTED, Disp: 1 each, Rfl: 0    Calcium Carbonate-Vitamin D (OYSTER SHELL CALCIUM/D) 500-200 MG-UNIT TABS, Take 1 tablet by mouth daily. , Disp: 30 tablet, Rfl: 0    albuterol sulfate HFA (PROAIR HFA) 108 (90 Base) MCG/ACT inhaler, Inhale 2 puffs into the lungs every 4 hours as needed for Wheezing or Shortness of Breath, Disp: 1 Inhaler, Rfl: 12  Patient has no known allergies. Social History     Tobacco Use    Smoking status: Never Smoker    Smokeless tobacco: Never Used   Vaping Use    Vaping Use: Never used   Substance Use Topics    Alcohol use: No    Drug use: No     Family History   Problem Relation Age of Onset    Asthma Sister     Asthma Sister        Review of Systems   Constitutional: Negative for chills and fever. HENT: Positive for congestion and postnasal drip. Negative for ear discharge, hearing loss, nosebleeds, rhinorrhea, sinus pain, tinnitus, trouble swallowing and voice change. Respiratory: Negative for cough and shortness of breath. Cardiovascular: Negative for chest pain and palpitations. Gastrointestinal: Negative for vomiting. Skin: Negative for rash. Allergic/Immunologic: Negative for environmental allergies. Neurological: Negative for dizziness and headaches. Hematological: Does not bruise/bleed easily.    All other systems reviewed and are negative. /86   Pulse 118   Wt 112 lb (50.8 kg)   LMP 09/04/2021 (Exact Date)   BMI 19.22 kg/m²   Physical Exam  Vitals and nursing note reviewed. Constitutional:       Appearance: She is well-developed. HENT:      Head: Normocephalic and atraumatic. Right Ear: Tympanic membrane and ear canal normal.      Left Ear: Tympanic membrane and ear canal normal.      Nose: Congestion present. Comments: 80% packing in place   Eyes:      Pupils: Pupils are equal, round, and reactive to light. Neck:      Thyroid: No thyromegaly. Trachea: No tracheal deviation. Cardiovascular:      Rate and Rhythm: Normal rate. Pulmonary:      Effort: Pulmonary effort is normal. No respiratory distress. Musculoskeletal:         General: No tenderness. Normal range of motion. Cervical back: Normal range of motion and neck supple. Skin:     General: Skin is warm and dry. Neurological:      Mental Status: She is alert. Cranial Nerves: No cranial nerve deficit. IMPRESSION/PLAN:  Seen and examined for history of revision functional endoscopic endoscopic sinus surgery doing well with no epistaxis, she has approximately 50% 80% of her packing remaining, no sign of synechiae continue nasal saline rinses and follow-up with ENT in 6 weeks. Dr. Andrew De La Garza.  Otolaryngology Facial Plastic Surgery  :  [facility] Otolaryngology/Facial Plastic Surgery Residency  Associate Clinical Professor:  Riki Jacobsen, [facility]

## 2021-10-03 RX ORDER — FLUTICASONE PROPIONATE 50 MCG
2 SPRAY, SUSPENSION (ML) NASAL DAILY
Qty: 1 EACH | Refills: 0 | Status: SHIPPED | OUTPATIENT
Start: 2021-10-03

## 2021-11-01 ENCOUNTER — OFFICE VISIT (OUTPATIENT)
Dept: ENT CLINIC | Age: 31
End: 2021-11-01
Payer: COMMERCIAL

## 2021-11-01 VITALS
BODY MASS INDEX: 19.12 KG/M2 | SYSTOLIC BLOOD PRESSURE: 144 MMHG | HEIGHT: 64 IN | DIASTOLIC BLOOD PRESSURE: 82 MMHG | HEART RATE: 114 BPM | WEIGHT: 112 LBS

## 2021-11-01 DIAGNOSIS — J33.9 NASAL POLYP: Primary | ICD-10-CM

## 2021-11-01 DIAGNOSIS — J34.89 RHINORRHEA: ICD-10-CM

## 2021-11-01 PROCEDURE — 99024 POSTOP FOLLOW-UP VISIT: CPT | Performed by: OTOLARYNGOLOGY

## 2021-11-01 ASSESSMENT — ENCOUNTER SYMPTOMS
COUGH: 0
SHORTNESS OF BREATH: 0
RHINORRHEA: 0
VOMITING: 0
SINUS PAIN: 0

## 2021-11-01 NOTE — PROGRESS NOTES
[facility] Otolaryngology  Dr. Leslee Davis. Ms.Ed        Patient Name:  Aguila Solis  :  1990     CHIEF C/O:    Chief Complaint   Patient presents with    Follow-up     6 weeks post FESS . states drainage has improved        HISTORY OBTAINED FROM:  patient    HISTORY OF PRESENT ILLNESS:       Luis Singh is a 27y.o. year old female, here today for follow up of  history of revision of bilateral functional scopic sinus pressure for history nasal polyposis, patient is doing well no complaints of vision changes headaches sore throat fever chills or epistaxis.     Past Medical History:   Diagnosis Date    Asthma     follows with Dr Kati Ordonez Bronchiectasis [facility])     Nasal polyp     Osteoporosis     Osteoporosis 2014    Premature baby     Scoliosis 3/9/2015    Seasonal allergies     TMJ (temporomandibular joint disorder)     Vitamin D deficiency     Vitamin D deficiency 2014     Past Surgical History:   Procedure Laterality Date    PFT-LAB  2014    SINUS ENDOSCOPY N/A 2021    REVISION FUNCTIONAL ENDOSCOPIC SINUS SURGERY performed by Megan Davis DO at [facility] OR    SINUS SURGERY         Current Outpatient Medications:     fluticasone (FLONASE) 50 MCG/ACT nasal spray, 2 sprays by Nasal route daily 2 sprays each nostril once daily, Disp: 1 each, Rfl: 0    ondansetron (ZOFRAN ODT) 4 MG disintegrating tablet, Place 1 tablet under the tongue every 8 hours as needed for Nausea or Vomiting, Disp: 10 tablet, Rfl: 1    montelukast (SINGULAIR) 10 MG tablet, Take 1 tablet by mouth nightly, Disp: 30 tablet, Rfl: 3    vitamin D (ERGOCALCIFEROL) 1.25 MG (20450 UT) CAPS capsule, Take 1 capsule by mouth once a week, Disp: 12 capsule, Rfl: 1    fluticasone-salmeterol (ADVAIR DISKUS) 500-50 MCG/DOSE diskus inhaler, Inhale 1 puff into the lungs 2 times daily Rinse mouth after use., Disp: 1 Inhaler, Rfl: 12    ALLERGY RELIEF 180 MG tablet, TAKE 1 TABLET BY MOUTH EVERY DAY, Disp: 90 tablet, Rfl: 3    montelukast (SINGULAIR) 10 MG tablet, Take 1 tablet by mouth daily, Disp: 30 tablet, Rfl: 3    albuterol (PROVENTIL) (2.5 MG/3ML) 0.083% nebulizer solution, Take 3 mLs by nebulization every 6 hours as needed for Wheezing, Disp: 900 mL, Rfl: 5    montelukast (SINGULAIR) 10 MG tablet, Take 1 tablet by mouth daily, Disp: 30 tablet, Rfl: 3    cetirizine (ZYRTEC) 10 MG tablet, TAKE 1 TABLET BY MOUTH EVERY DAY, Disp: 90 tablet, Rfl: 0    mometasone-formoterol (DULERA) 200-5 MCG/ACT inhaler, Inhale 2 puffs into the lungs 2 times daily Rinse mouth after using., Disp: 1 Inhaler, Rfl: 12    Nebulizers (PORTABLE COMPRESSOR NEBULIZER) MISC, Reason: Severe asthma and bronchiectasis, Disp: 1 each, Rfl: 0    Nebulizers (VIOS AEROSOL DELIVERY SYSTEM) MISC, USE AS DIRECTED, Disp: 1 each, Rfl: 0    Calcium Carbonate-Vitamin D (OYSTER SHELL CALCIUM/D) 500-200 MG-UNIT TABS, Take 1 tablet by mouth daily. , Disp: 30 tablet, Rfl: 0    albuterol sulfate HFA (PROAIR HFA) 108 (90 Base) MCG/ACT inhaler, Inhale 2 puffs into the lungs every 4 hours as needed for Wheezing or Shortness of Breath, Disp: 1 Inhaler, Rfl: 12  Patient has no known allergies. Social History     Tobacco Use    Smoking status: Never Smoker    Smokeless tobacco: Never Used   Vaping Use    Vaping Use: Never used   Substance Use Topics    Alcohol use: No    Drug use: No     Family History   Problem Relation Age of Onset    Asthma Sister     Asthma Sister        Review of Systems   Constitutional: Negative for chills and fever. HENT: Positive for congestion. Negative for ear discharge, hearing loss, postnasal drip, rhinorrhea, sinus pain, sneezing and tinnitus. Respiratory: Negative for cough and shortness of breath. Cardiovascular: Negative for chest pain and palpitations. Gastrointestinal: Negative for vomiting. Skin: Negative for rash. Allergic/Immunologic: Negative for environmental allergies.    Neurological: Negative for dizziness and headaches. Hematological: Does not bruise/bleed easily. All other systems reviewed and are negative. BP (!) 144/82   Pulse 114   Ht 5' 4\" (1.626 m)   Wt 112 lb (50.8 kg)   BMI 19.22 kg/m²   Physical Exam  Vitals and nursing note reviewed. Constitutional:       Appearance: She is well-developed. HENT:      Head: Normocephalic and atraumatic. Right Ear: Tympanic membrane, ear canal and external ear normal. There is no impacted cerumen. Left Ear: Tympanic membrane, ear canal and external ear normal. There is no impacted cerumen. Nose: Congestion present. No rhinorrhea. Mouth/Throat:      Pharynx: No oropharyngeal exudate. Eyes:      Pupils: Pupils are equal, round, and reactive to light. Neck:      Thyroid: No thyromegaly. Trachea: No tracheal deviation. Cardiovascular:      Rate and Rhythm: Normal rate. Pulmonary:      Effort: Pulmonary effort is normal. No respiratory distress. Musculoskeletal:         General: Normal range of motion. Cervical back: Normal range of motion. Lymphadenopathy:      Cervical: No cervical adenopathy. Skin:     General: Skin is warm. Findings: No erythema. Neurological:      Mental Status: She is alert. Cranial Nerves: No cranial nerve deficit. IMPRESSION/PLAN:  Saline 3-4 times daily  Continue flonase daily  4 mo f/u    Dr. Sindy Zhang.  Otolaryngology Facial Plastic Surgery  :  [address] [place] [address] Otolaryngology/Facial Plastic Surgery Residency  Associate Clinical Professor:  [facility]

## 2021-12-06 RX ORDER — ALBUTEROL SULFATE 2.5 MG/3ML
SOLUTION RESPIRATORY (INHALATION)
Qty: 900 ML | Refills: 5 | Status: SHIPPED | OUTPATIENT
Start: 2021-12-06

## 2021-12-16 ENCOUNTER — TELEPHONE (OUTPATIENT)
Dept: ADMINISTRATIVE | Age: 31
End: 2021-12-16

## 2021-12-16 NOTE — TELEPHONE ENCOUNTER
Patient believes she has a sinus infection along with right ear pain going on 1 week. Symptpms include congestion and drainage. No hearing loss. She wants to know is Dr. Severo Kilts can call RX to pharmacy; [facility]. Please advise.

## 2021-12-18 NOTE — TELEPHONE ENCOUNTER
If the patient is afebrile, I would continue conservative medical management Tylenol Motrin plain saline rinses, and over-the-counter decongestants as well as cough suppressants if persistent problems she may see the nurse practitioner or myself in the office with she is been provided with multiple series of antibiotics recently.

## 2022-02-05 DIAGNOSIS — J32.9 CHRONIC SINUSITIS, UNSPECIFIED LOCATION: Primary | ICD-10-CM

## 2022-02-07 RX ORDER — MONTELUKAST SODIUM 10 MG/1
10 TABLET ORAL DAILY
Qty: 30 TABLET | Refills: 3 | Status: SHIPPED
Start: 2022-02-07 | End: 2022-02-21 | Stop reason: SDUPTHER

## 2022-02-07 NOTE — TELEPHONE ENCOUNTER
Patient was last seen in office 11/1/2021 patient is scheduled to come back in to the office 5/1/2022 patient would like a prescription refill for Singulair.

## 2022-02-14 ENCOUNTER — OFFICE VISIT (OUTPATIENT)
Dept: PULMONOLOGY | Age: 32
End: 2022-02-14
Payer: COMMERCIAL

## 2022-02-14 DIAGNOSIS — J47.9 BRONCHIECTASIS WITHOUT COMPLICATION (HCC): ICD-10-CM

## 2022-02-14 DIAGNOSIS — J45.909 UNCOMPLICATED ASTHMA, UNSPECIFIED ASTHMA SEVERITY, UNSPECIFIED WHETHER PERSISTENT: ICD-10-CM

## 2022-02-14 PROCEDURE — 99214 OFFICE O/P EST MOD 30 MIN: CPT | Performed by: INTERNAL MEDICINE

## 2022-02-14 PROCEDURE — 99213 OFFICE O/P EST LOW 20 MIN: CPT | Performed by: INTERNAL MEDICINE

## 2022-02-14 NOTE — PROGRESS NOTES
Patient to be seen in one year. Patient was given the flu vaccine during office visit under the direction of Dr. Elyssa Keys.

## 2022-02-14 NOTE — PROGRESS NOTES
[address] [facility]  Department of Internal Medicine  Division of Pulmonary, Critical Care and Sleep Medicine  Office Note    Dear Jony Ewing, DO    We had the pleasure of seeing Eli Rivera in the [address] for her central bronchiectasis, nasal polyps and asthma    HISTORY OF PRESENT ILLNESS:    Eli Rivera is a pleasant 27 y.o. female with a past medical history of scoliosis, allergies and asthma. Ms. Ricardo Turner presents to the office today for follow up regarding her lung problems. She is present with her mother today to discuss recent finding on her CT scan. Heather Garnica was born premature (3 months) at 2 pounds, and spend month in the hospital. They were never told she had sinu. undergone a work up. She says her breathing has improved but she continues to produces green sputum. She has occasional wheezing and chest tightness but this is improved. Her nighttime awakenings have improved, but they are still present occasionally. She uses her rescue inhaler several times a week and feels she needs it more but is afraid to use it because she isn't sure how much she can use. She has post nasal drip, rhinitis, snoring, hoarseness and occasional night sweats. She denies any daytime somnolence, palpitations, PND or orthopnea. She has had sinus surgery in 2011 and has had several steroid tapers since then. We reviewed the results of her DEXA bone scan and Vitamin D level which were low and will start her on calcium and a bisphosphonate. We also found she has Spue antibodies + (done for the work up for osteoporosis - they don't wasn't a biopsy and started a gluten free diet. Her CT with HRCT images were obtained and show central > distal bronchiectasis, and ground glass changes. Intially, she was given the diagnosis of kartagener syndrome.  However continue review this issue was undertaken reviewed imaging with radiology and other pulmonary physicians who did not see the sinus inversus and thus the Umbertogener diagnosis eliminated or remains in question. At this point not sure what her underlying total pathophysiologic diagnosis is it is known that primary ciliary dyskinesia is of which Santos falls into only 50% of the patient's will have sinus inversus however due to her Methodist and her age she does not want to undergo biopsy to confirm ciliary function. However despite the sinus inversus issue she does have significant central greater than distal bronchiectasis and nasal polyps which will remain under therapy her lung function remains at 51% she is using the vest twice a day along with inhaled corticosteroids and Mucomyst although the Mucomyst makes her nauseous so we will switch to 3% saline twice a day before therapy. Overall she states she feels better with less cough follow-up imaging and additional blood work for bronchiectasis was requested mainly to rule out connective tissue diseases and rheumatologic conditions. It should be noted that HIV testing was requested but religiously she will not complete. Treatment was adjusted. She is doing well with treatment. She is working without limitations. She is not dating. She wished not to have the biopsy at this time. On today's visit, Camille Gutiérrez she underwent nasal polyp surgery. As far as her pulmonary disease is concerned she has been fairly noncompliant for the past 2 years not taking any of her inhalers, mucociliary clearance medications, or using the flutter valve. Her lung function was severe with an FEV1 of 1.40 L only 44% of predicted. We encouraged compliance with treatment. Given her unclear diagnosis with possibility of Samter Syndrome and or PCD syndrome we started ICS and Singulair and asked her to avoid NSAIDs and ASA. Biopsy for EM by ENT to see if PCD is present. They never did it or sent it to the pathologist to do.      ALLERGIES:  No Known Allergies    PAST MEDICAL HISTORY:       Diagnosis Date    Asthma     follows with Dr Carri Martini Bronchiectasis [facility])     Nasal polyp     Osteoporosis     Osteoporosis 12/8/2014    Premature baby     Scoliosis 3/9/2015    Seasonal allergies     TMJ (temporomandibular joint disorder)     Vitamin D deficiency     Vitamin D deficiency 12/8/2014        SURGICAL HISTORY:   Past Surgical History:   Procedure Laterality Date    PFT-LAB  7/18/2014    SINUS ENDOSCOPY N/A 9/14/2021    REVISION FUNCTIONAL ENDOSCOPIC SINUS SURGERY performed by Terri Jesus DO at [address] HISTORY:  Musslum Quaker. The patient never smoked  There is no history of TB or TB exposure - had negative PPD most recently in 11/2012. There is no asbestos or silica dust exposure. The patient reports No coal, foundry, quarry or [employer] exposure. There is no recent travel history noted. The patient denies a history of recreational or IV drug use. No hot tub exposure. The patient has no pets. ETOH:denies. Single, lives at home with family. 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  Graduate with BA in Education at [employer] [address]:   Current Outpatient Medications   Medication Sig Dispense Refill    montelukast (SINGULAIR) 10 MG tablet TAKE 1 TABLET BY MOUTH DAILY 30 tablet 3    albuterol (PROVENTIL) (2.5 MG/3ML) 0.083% nebulizer solution USE 1 VIAL VIA NEBULIZER EVERY 6 HOURS AS NEEDED WHEEZING 900 mL 5    fluticasone (FLONASE) 50 MCG/ACT nasal spray 2 sprays by Nasal route daily 2 sprays each nostril once daily 1 each 0    ondansetron (ZOFRAN ODT) 4 MG disintegrating tablet Place 1 tablet under the tongue every 8 hours as needed for Nausea or Vomiting 10 tablet 1    montelukast (SINGULAIR) 10 MG tablet Take 1 tablet by mouth nightly 30 tablet 3    vitamin D (ERGOCALCIFEROL) 1.25 MG (42493 UT) CAPS capsule Take 1 capsule by mouth once a week 12 capsule 1    fluticasone-salmeterol (ADVAIR DISKUS) 500-50 MCG/DOSE diskus inhaler Inhale 1 puff into the lungs 2 times daily Rinse mouth after use. 1 Inhaler 12    albuterol sulfate HFA (PROAIR HFA) 108 (90 Base) MCG/ACT inhaler Inhale 2 puffs into the lungs every 4 hours as needed for Wheezing or Shortness of Breath 1 Inhaler 12    ALLERGY RELIEF 180 MG tablet TAKE 1 TABLET BY MOUTH EVERY DAY 90 tablet 3    montelukast (SINGULAIR) 10 MG tablet Take 1 tablet by mouth daily 30 tablet 3    cetirizine (ZYRTEC) 10 MG tablet TAKE 1 TABLET BY MOUTH EVERY DAY 90 tablet 0    mometasone-formoterol (DULERA) 200-5 MCG/ACT inhaler Inhale 2 puffs into the lungs 2 times daily Rinse mouth after using. 1 Inhaler 12    Nebulizers (PORTABLE COMPRESSOR NEBULIZER) MISC Reason: Severe asthma and bronchiectasis 1 each 0    Nebulizers (VIOS AEROSOL DELIVERY SYSTEM) MISC USE AS DIRECTED 1 each 0    Calcium Carbonate-Vitamin D (OYSTER SHELL CALCIUM/D) 500-200 MG-UNIT TABS Take 1 tablet by mouth daily. 30 tablet 0     No current facility-administered medications for this visit. FAMILY HISTORY: A review of medical events in the patient's family , including disease which may be hereditary or place the patient at risk were reviewed. Family History   Problem Relation Age of Onset    Asthma Sister     Asthma Sister           REVIEW OF SYSTEMS:  Constitutional: Denies generally weak and loss of appetite. Complains of  fever, chills and night sweats. Skin: Denies rash, itching, bruising, lumps or bumps. EENT: Denies tinnitus, Complains of nasal congestion, snoring, sore throat and hoarseness. TMJ dysfunction. Chronic sinusitis and nasal polyps. Cardiovascular:  Denies palpitations, chest pain and PND or orthopnea. Complains of chest tightness with SOB and wheezing. Respiration:  Denies hemoptysis, smoker and TB exposure.  Complains of dyspnea at rest, dyspnea on exertion, increased productive cough with green sputum and wheezing. Gastrointestinal:Genitourinary:  Denies poor appetite, dysphagia, heartburn or reflux, abdominal pain, excessive gas or bloating, melena, hematochezia, constipation, diarrhea, dysuria, frequency/urgency, hematuria. Musculoskeletal: Denies myalgias, arthralgias, joint swelling, stiff joints and decreased range of motion. Breasts: Denies breast lump, breast tenderness and nipple discharge. Neurological: Denies dizzy/vertigo, headache, numbness/tingling, loss of consciousness. Psychological: Denies anxiety, depression. Endocrine:  Denies heat intolerance, cold intolerance and diabetic symptoms including neither polyuria nor polydipsia nor blurred vision nor neuropathy. Hematopoietic/lymphatic: Denies bleeding problems, bruising, jaundice and swollen lymph nodes. PHYSICAL EXAMINATION:   There were no vitals filed for this visit. Constitutional: A thin, frail, short stature  27 y.o. female who is alert, oriented, cooperative and in no apparent distress. EENT: EOMI, right lazy eye, DURAN. MMM. No discharge. Midline, mucosa was without erythema, exudates or cobblestoning. No thrush. Left nasal polyp. TMJ laxity and clicking. Micronathia. Neck: Supple without thyromegaly. No elevated JVP. Trachea was midline. No carotid bruits. Respiratory: Asymmetrical without dullness to percussion. Harsh crackles with faint exp wheezing bilateral mid lung fields. Opening snaps but no rhonchi or rales. Cardiovascular: RRR without murmur, clicks, gallops or rubs. No left or right ventricular heave. Pulses:  Carotid, radial and femoral pulses were equal bilaterally. Abdomen: Scaphoid, soft without organomegaly. No rebound, rigidity. Lymphatic: No lymphadenopathy. Musculoskeletal: Ambulates without assistance. Scoliotic/Kyphotic curvature of the spine. No involuntary movements. Extremities:  No lower extremity edema or erythema.  Deep tendon reflexes are normal. Skin:  Warm and dry. Color, turgor and pigmentation was relatively normal. No bruises or skin rashes. Neurological/Psychiatric: Thought content is normal. The patient's emotional status is normal.  Cranial nerves II-XII are intact. DATA:   The data collected below information that was obtained, reviewed, analyzed and interpreted today. PRE OP CHEST FILM:           CT SCAN CHEST 2014:  Bronchiectasis particularly into the lower lobes of the lingula with retained bronchial secretions. Signs of diffuse bronchiolitis particularly into the lower lobes and discrete peribronchial opacities some with fingerlike extension particularly into the left upper lobe with the appearance of mucous plugging. Correlate for atypical infection to include fungal etiologies. Bands of atelectasis with bronchial retraction across the medial aspect of the right lower lobe, medial aspect of the left upper lobe and medial aspect of the right middle lobe with associated air cysts across the medial aspect of the left upper lobe. Tracheal versus Zenker's diverticulum. Office Spirometry was compared to previous test if available and demonstrates an FVC of 2.13 liters which is 56 % of predicted with an FEV1 of 1.45 liters which is 44 % of predicted. FEV1/FVC ratio is 68 %. Post bronchodilator FEV1 is 1.50 liters which is 48% predicted. Mid expiratory flow rates are 29 % of predicted. Maximum voluntary ventilation is 61 liters per minute or 57 % of predicted. Flow volume loop shows no signs of intrathoracic or extrathoracic process. Impression: Moderate Airflow limitations, unchanged. Static lung volumes [7/17/2014] demonstrate an ERV 0.72 liters which is 49% predicted, TGV of 3.08 liters which is 112% predicted. Her RV is 2.36 which is 187% predicted, TLC is 4.91 liters which is 97% predicted. Her RV/TLC ratio is 200% predicted. DLCO is 74% predicted but corrects to normal at 98% when corrected for alveolar ventilation. Nitric Oxide level today: 7 ppb   A low Yeison (less than 25) in adults implies non eosinophilic airway inflammation or absence of airway inflammation. A high Yeison (greater than 50) in adults or a rising Yeison with a greater than 40% change from a previously stable level implies uncontrolled or deteriorating eosinophilic airway inflammation. NASAL SURGERY: Nasal sinus contents, scrapings-  Fragments of benign respiratory epithelial lined mucosa with chronic sinusitis. Benign appearing attached bony tissue is also present. Comment-              Some of the tissue fragments show vaguely polypoid  configuration and stromal edema suggestive of inflammatory polyp. NASAL BIOSPY: 2011: Nasal sinus contents, scrapings - Fragments of  benign respiratory epithelial lined mucosa with chronic sinusitis. Benign appearing attached bony tissue is also present. DEXA SCAN 2014: Impression: There is osteoporosis noted in the lumbar spine. There is no osteoporosis or osteopenia noted in the right or left femoral neck. Cystic Fibrosis Gene Profile:  Negative  Rheumatoid factor    Negative  ANTHONY     Negative   Anti Scl70    Negative  Anti Jo1    Negative  Anti SSA SSB   Negative  CF mutation panel   Negative  Ig E     14 IU    Asthma Control Test [ACT] is a 5 part questionnaire, which has a point value system that ranges from 1- 5. This test is a validated questionnaire to help determine asthma control. A total score of 19 or below indicates that the patients asthma may not be well controlled. If used in conjunction with PFTs, the correlation of determining asthma control is even stronger. Today on testing the ACT was 19 out of max of 25. ASSESSMENT:  Bessy Klein is a young woman with central bronchiectasis, sinusitis without organ inverses. She also has osteoporosis. With this in mind, we requested the following.        PLAN: Moriah Salinas was seen today improved post surgery, my frustration is compliance and the ENT not thinking about sending the pathology for EM. We encourages ICS and the VEST (mucociliary) and Albuterol/3% saline mix to BID from TID. As mentioned above we are unsure of the final diagnosis but she clearly has sinusitis biopsy-proven and the bronchiectasis as defined by high resolution CT scan. Causes for bronchiectasis will now be further defined with additional blood work for vasculitis and connective tissue disease - Aspiration, HIV, and inflammatory bowel disease along with sprue are all causes of bronchiectasis. Patient did have an abnormal celiac sprue antibody ( TISSUE TRANSGLUTAMINASE IGA 39 ) but does not want a half a GI biopsy. The previous diagnosis of Kartagener syndrome was questioned - we reviewed the CT images and discussed the case with other pulmonary physicians and found out the CT was labeled inappropriately and the sinus inversus is not present. However it should be noted that primary ciliary dyskinesia syndrome of which Kartagener follows under in 50% of the cases no sinus inversus is found. Previous nasal biopsy done in 2011 we asked the pathologist to be review and do electron microscopy to see if they see the ciliary structure abnormality - they could not do anything with the old 2011 samples. In addition to that we asked her to get a vitamin D level. Last was 2021 and it was only 7. Of note we asked that the nasal biopsy sent for electron microscopy to rule out Kartagener's. Also Given her unclear diagnosis with possibility of Samter Syndrome and or PCD syndrome we started ICS and Singulair and asked her to avoid NSAIDs and ASA. Biopsy for EM by ENT to see if PCD is present was not done. Vaccines were reviewed. She never got the repeat DEXA scan (cost) and had osteoporosis in 2018 along with that she stopped the vitamin D medications. We will see her back in 4 - 6 months. Thank you for entrusting us to participate in [facility].   If you have any questions, please don't hesitate to call us at the [email or web address]. Sincerely,    Reyes Núñez DO, MPH, Tuyet Holland, FACP  Director, [employer]  Professor of Internal Medicine  [address] [facility]  [address] [place] [address]    PREVIOUS : Given her CT scan showing bronchiectasis with significant bronchial wall thickening and mucoid impaction at the distal bronchioles, we recommend that she undergo bronchoscopy for tissue diagnosis and for analysis of sputum to further define her underlying organism such as Pseudomonas, Haemophilus and/or staph aureus and atypical mycobacteria. However the mother was concerned about putting her under anesthesia did outline the procedure risks benefits and alternatives - the family decided against the biopsy. With her young age and osteoporosis we did a secondary work up and found her to have a positive tissue transglutaminase Ig A level. We asked her to get a biopsy and they refused but she did started gluten-free diet given the recent positive sprue antibodies.

## 2022-02-16 RX ORDER — CA/D3/MAG OX/ZINC/COP/MANG/BOR 600 MG-800
600 TABLET,CHEWABLE ORAL 2 TIMES DAILY
Qty: 60 TABLET | Refills: 5 | Status: SHIPPED | OUTPATIENT
Start: 2022-02-16 | End: 2022-03-18

## 2022-02-21 ENCOUNTER — OFFICE VISIT (OUTPATIENT)
Dept: FAMILY MEDICINE CLINIC | Age: 32
End: 2022-02-21
Payer: COMMERCIAL

## 2022-02-21 VITALS
TEMPERATURE: 97.7 F | RESPIRATION RATE: 18 BRPM | OXYGEN SATURATION: 98 % | HEIGHT: 64 IN | SYSTOLIC BLOOD PRESSURE: 110 MMHG | WEIGHT: 106 LBS | BODY MASS INDEX: 18.1 KG/M2 | DIASTOLIC BLOOD PRESSURE: 68 MMHG | HEART RATE: 92 BPM

## 2022-02-21 DIAGNOSIS — M41.25 OTHER IDIOPATHIC SCOLIOSIS, THORACOLUMBAR REGION: ICD-10-CM

## 2022-02-21 DIAGNOSIS — E55.9 VITAMIN D DEFICIENCY: ICD-10-CM

## 2022-02-21 DIAGNOSIS — J47.9 BRONCHIECTASIS WITHOUT COMPLICATION (HCC): Primary | ICD-10-CM

## 2022-02-21 DIAGNOSIS — J45.909 UNCOMPLICATED ASTHMA, UNSPECIFIED ASTHMA SEVERITY, UNSPECIFIED WHETHER PERSISTENT: ICD-10-CM

## 2022-02-21 DIAGNOSIS — H92.01 EAR PAIN, RIGHT: ICD-10-CM

## 2022-02-21 DIAGNOSIS — M81.0 OSTEOPOROSIS WITHOUT CURRENT PATHOLOGICAL FRACTURE, UNSPECIFIED OSTEOPOROSIS TYPE: ICD-10-CM

## 2022-02-21 DIAGNOSIS — H61.23 BILATERAL IMPACTED CERUMEN: ICD-10-CM

## 2022-02-21 PROCEDURE — 99204 OFFICE O/P NEW MOD 45 MIN: CPT | Performed by: INTERNAL MEDICINE

## 2022-02-21 SDOH — ECONOMIC STABILITY: FOOD INSECURITY: WITHIN THE PAST 12 MONTHS, THE FOOD YOU BOUGHT JUST DIDN'T LAST AND YOU DIDN'T HAVE MONEY TO GET MORE.: NEVER TRUE

## 2022-02-21 SDOH — ECONOMIC STABILITY: FOOD INSECURITY: WITHIN THE PAST 12 MONTHS, YOU WORRIED THAT YOUR FOOD WOULD RUN OUT BEFORE YOU GOT MONEY TO BUY MORE.: NEVER TRUE

## 2022-02-21 ASSESSMENT — PATIENT HEALTH QUESTIONNAIRE - PHQ9
SUM OF ALL RESPONSES TO PHQ QUESTIONS 1-9: 0
1. LITTLE INTEREST OR PLEASURE IN DOING THINGS: 0
SUM OF ALL RESPONSES TO PHQ QUESTIONS 1-9: 0
SUM OF ALL RESPONSES TO PHQ QUESTIONS 1-9: 0
SUM OF ALL RESPONSES TO PHQ9 QUESTIONS 1 & 2: 0
2. FEELING DOWN, DEPRESSED OR HOPELESS: 0
SUM OF ALL RESPONSES TO PHQ QUESTIONS 1-9: 0

## 2022-02-21 ASSESSMENT — SOCIAL DETERMINANTS OF HEALTH (SDOH): HOW HARD IS IT FOR YOU TO PAY FOR THE VERY BASICS LIKE FOOD, HOUSING, MEDICAL CARE, AND HEATING?: NOT HARD AT ALL

## 2022-02-21 NOTE — PROGRESS NOTES
[facility]  [address] [facility] [address]  [facility] [address]  Dept: [phone]  Dept Fax: [phone]     NAME: Miranda Winkler        :  1990        MRN:  [id number]    Chief Complaint   Patient presents with    New Patient     est pcp / former PCP Dr. Khanh Walsh     RT earache since having sinus surgery 2021       History of Present Illness  Miranda Winkler is a 27 y.o. female who presents today to [facility] care. Prior PCP: Dr. Conner Fernández    Patient follows closely with pulmonology and ENT. She has known bronchiectasis and is currently being worked up for possible causes. Several of her chronic medical conditions are caused by the fact that she was born 3 months prematurely. In 2021 she had a revision of bilateral functional scopic sinus surgery for hx of nasal polyposis. Patient reports that since the surgery she has had right ear pain. She has followed up with ENT about this and followed their suggestions of trying allegra D, saline, and as needed tylenol which are providing no relief. Patient is concerned that she may be developing an ear infection on the right side. She denies any other sinus symptoms of congestion, sore throat, headache, or cough. Recent lab work was reviewed. Patient does have an extremely low vitamin D level and states she has been a high-dose prescription strength vitamin D for a while now. She has been out of her vitamin D for the last couple weeks as she states her pharmacy is out of stock and she is waiting for the new shipment to arrive. She does not take any over-the-counter vitamin D supplement. Review of Systems  Please see HPI above. All bolded are positive.   Gen: fever, chills, fatigue, weakness, diaphoresis, or unintentional weight change  Head: headache, vision change, hearing loss, ear pain  Chest: chest pain/heaviness, palpitations  Lungs: shortness of breath, wheezing, coughing, hemoptysis  Abdomen: abdominal pain, nausea, vomiting, diarrhea, constipation, melena, hematochezia, hematemesis, or loss of appetite  Extremities: lower extremity edema, myalgias, arthralgias  Urinary: dysuria, hematuria, weak flow, or increase in frequency  Neurologic: lightheadedness, dizziness, confusion, syncope  Endocrine: polydipsia, polyuria, heat or cold intolerance  Psychiatric: depression, suicidal ideation, anxiety  Derm: Rashes, ulcers, burns    Medical History   Past Medical History:   Diagnosis Date    Asthma     follows with Dr Benton Ordoñez Bronchiectasis [facility])     Nasal polyp     Osteoporosis     Osteoporosis 12/8/2014    Premature baby     Scoliosis 3/9/2015    Seasonal allergies     TMJ (temporomandibular joint disorder)     Vitamin D deficiency     Vitamin D deficiency 12/8/2014       Surgical History   Past Surgical History:   Procedure Laterality Date    PFT-LAB  7/18/2014    SINUS ENDOSCOPY N/A 9/14/2021    REVISION FUNCTIONAL ENDOSCOPIC SINUS SURGERY [facility]al DO at [facility] OR    SINUS SURGERY         Family History  Family History   Problem Relation Age of Onset    Asthma Sister     Asthma Sister        Social History  Social History     Tobacco Use    Smoking status: Never Smoker    Smokeless tobacco: Never Used   Substance Use Topics    Alcohol use: No       Home Medications  Current Outpatient Medications   Medication Sig Dispense Refill    Calcium Carbonate-Vit D-Min (CALTRATE 600+D PLUS MINERALS) 600-800 MG-UNIT CHEW Take 600 mg by mouth in the morning and at bedtime 60 tablet 5    albuterol (PROVENTIL) (2.5 MG/3ML) 0.083% nebulizer solution USE 1 VIAL VIA NEBULIZER EVERY 6 HOURS AS NEEDED WHEEZING 900 mL 5    fluticasone (FLONASE) 50 MCG/ACT nasal spray 2 sprays by Nasal route daily 2 sprays each nostril once daily 1 each 0    vitamin D (ERGOCALCIFEROL) 1.25 MG (64406 UT) CAPS capsule Take 1 capsule by mouth once a week 12 capsule 1    fluticasone-salmeterol (ADVAIR DISKUS) 500-50 MCG/DOSE diskus inhaler Inhale 1 puff into the lungs 2 times daily Rinse mouth after use. 1 Inhaler 12    ALLERGY RELIEF 180 MG tablet TAKE 1 TABLET BY MOUTH EVERY DAY 90 tablet 3    montelukast (SINGULAIR) 10 MG tablet Take 1 tablet by mouth daily 30 tablet 3    cetirizine (ZYRTEC) 10 MG tablet TAKE 1 TABLET BY MOUTH EVERY DAY 90 tablet 0    Nebulizers (PORTABLE COMPRESSOR NEBULIZER) MISC Reason: Severe asthma and bronchiectasis 1 each 0    Calcium Carbonate-Vitamin D (OYSTER SHELL CALCIUM/D) 500-200 MG-UNIT TABS Take 1 tablet by mouth daily. 30 tablet 0    ondansetron (ZOFRAN ODT) 4 MG disintegrating tablet Place 1 tablet under the tongue every 8 hours as needed for Nausea or Vomiting (Patient not taking: Reported on 2/21/2022) 10 tablet 1    albuterol sulfate HFA (PROAIR HFA) 108 (90 Base) MCG/ACT inhaler Inhale 2 puffs into the lungs every 4 hours as needed for Wheezing or Shortness of Breath 1 Inhaler 12    mometasone-formoterol (DULERA) 200-5 MCG/ACT inhaler Inhale 2 puffs into the lungs 2 times daily Rinse mouth after using. (Patient not taking: Reported on 2/21/2022) 1 Inhaler 12    Nebulizers (VIOS AEROSOL DELIVERY SYSTEM) MISC USE AS DIRECTED 1 each 0     No current facility-administered medications for this visit. Allergies  No Known Allergies    Objective  Vitals:    02/21/22 1455   BP: 110/68   Pulse: 92   Resp: 18   Temp: 97.7 °F (36.5 °C)   TempSrc: Temporal   SpO2: 98%   Weight: 106 lb (48.1 kg)   Height: 5' 4\" (1.626 m)        Physical Exam:  General: Awake, alert, and oriented to person, place, time, and purpose, appears stated age and cooperative, No acute distress  Head: Normocephalic, atraumatic  Eyes: conjunctivae/corneas clear, EOM's intact. Ears: On initial evaluation TMs completely occluded by cerumen impaction. Lavage performed bilaterally with moderate amount of cerumen removed.   TMs only partially visualized after lavage although without any evidence of erythema, effusion, or perforation. Mouth: Mucous membranes moist with no pharyngeal exudate or erythema  Neck: no JVD, no adenopathy, no carotid bruit, supple, symmetrical, trachea midline  Back: symmetric, ROM normal, No CVA tenderness. Scoliosis. Lungs: clear to auscultation bilaterally without wheezes, rales, or rhonchi  Heart: regular rate and rhythm, S1, S2 normal, no murmur, click, rub or gallop  Abdomen: soft, non-tender; bowel sounds normal; no masses,  no organomegaly  Extremities: atraumatic, no cyanosis, no edema, 2+ pulses palpated in all 4 extremities  Skin: color, texture, turgor within normal limits. No rashes or lesions  Neurologic: speech appropriate, moves all 4 extremities, normal muscle strength and tone, CN 2-12 grossly intact    Ear lavage performed bilaterally, symptoms mildly improved post-lavage. TMs remain partially occluded although the areas seen are without erythema, effusion, or perforation. Pt advised to avoid Q-tip use and to follow-up with ENT as directed. PCP if symptoms recur. ED sooner if symptoms worsen or change. Labs  Lab Results   Component Value Date    WBC 14.5 (H) 04/23/2011    HGB 13.2 04/23/2011    HCT 40.8 04/23/2011     04/23/2011     08/20/2021    K 4.0 08/20/2021     08/20/2021    CREATININE 0.6 08/20/2021    BUN 12 08/20/2021    CO2 23 08/20/2021    GLUCOSE 92 08/20/2021    ALT 10 08/20/2021    AST 15 08/20/2021     No results found for: TSH  No results found for: TRIG  No results found for: HDL  No results found for: LDLCALC  No results found for: LABA1C  No results found for: INR, PROTIME   *All recent labs were reviewed. Please see electronic chart for a more comprehensive set of labs    Radiology  No results found.                                Health Maintenance Due   Topic Date Due    Hepatitis C screen  Never done    Varicella vaccine (1 of 2 - 2-dose childhood series) Never done   Mancini DTaP/Tdap/Td vaccine (6 - Tdap) 07/18/2000    HIV screen  Never done    Cervical cancer screen  Never done    Flu vaccine (1) 09/01/2021         Assessment and Plan  Apriltod Montalvo presents today to establish care. Neida Viramontes was seen today for new patient and otalgia. Diagnoses and all orders for this visit:    Bronchiectasis without complication ([address]  -Continue follow-up with pulmonology    Uncomplicated asthma, unspecified asthma severity, unspecified whether persistent    Vitamin D deficiency  -Patient advised to take a higher dose over-the-counter vitamin D supplement (5000 units) daily while she awaits the high-dose prescription strength supplement to arrive at her pharmacy. Ear pain, right    Bilateral impacted cerumen  -Ear lavage performed with moderate amount of cerumen removed. Osteoporosis without current pathological fracture, unspecified osteoporosis type    Other idiopathic scoliosis, thoracolumbar region        Educational materials and/or home exercises printed for patient's review and were included in patient instructions on his/her After Visit Summary and given to patient at the end of visit. Counseled regarding above diagnosis, including possible risks and complications, especially if left uncontrolled. Counseled regarding the possible side effects, risks, benefits and alternatives to treatment; patient and/or guardian verbalizes understanding, agrees, feels comfortable with and wishes to proceed with above treatment plan. Advised patient to call [facility] new medication issues, and read all Rx info from pharmacy to assure aware of all possible risks and side effects of medication before taking. Reviewed age and gender appropriate health screening exams and vaccinations.   Advised patient regarding importance of keeping up with recommended health maintenance and to schedule as soon as possible if overdue, as this is important in assessing for undiagnosed pathology, especially cancer, as well as protecting against potentially harmful/life threatening disease. Patient verbalizes understanding and agrees with above counseling, assessment and plan. All questions answered.     Aamir Santos, DO

## 2022-03-11 ENCOUNTER — TELEPHONE (OUTPATIENT)
Dept: FAMILY MEDICINE CLINIC | Age: 32
End: 2022-03-11

## 2022-03-11 DIAGNOSIS — H61.23 BILATERAL IMPACTED CERUMEN: Primary | ICD-10-CM

## 2022-03-11 DIAGNOSIS — H92.03 EAR PAIN, BILATERAL: ICD-10-CM

## 2022-03-11 NOTE — TELEPHONE ENCOUNTER
----- Message from Dipti Raeyle sent at 3/11/2022 12:51 PM EST -----  Subject: Message to Provider    QUESTIONS  Information for Provider? Patient needs a referral to Dr. Nate Contreras   an ENT. Will only see her for her ear issue if there is a referral.  ---------------------------------------------------------------------------  --------------  CALL BACK INFO  What is the best way for the office to contact you? OK to leave message on   voicemail  Preferred Call Back Phone Number? [phone]  ---------------------------------------------------------------------------  --------------  SCRIPT ANSWERS  Relationship to Patient?  Self

## 2022-03-28 ENCOUNTER — TELEPHONE (OUTPATIENT)
Dept: PULMONOLOGY | Age: 32
End: 2022-03-28

## 2022-03-28 DIAGNOSIS — J47.9 BRONCHIECTASIS WITHOUT COMPLICATION (HCC): Primary | ICD-10-CM

## 2022-03-28 DIAGNOSIS — J45.909 UNCOMPLICATED ASTHMA, UNSPECIFIED ASTHMA SEVERITY, UNSPECIFIED WHETHER PERSISTENT: ICD-10-CM

## 2022-03-28 RX ORDER — LEVOFLOXACIN 500 MG/1
500 TABLET, FILM COATED ORAL DAILY
Qty: 10 TABLET | Refills: 0 | Status: SHIPPED | OUTPATIENT
Start: 2022-03-28 | End: 2022-04-07

## 2022-03-28 RX ORDER — PREDNISONE 10 MG/1
TABLET ORAL
Qty: 30 TABLET | Refills: 0 | Status: SHIPPED | OUTPATIENT
Start: 2022-03-28

## 2022-03-28 NOTE — TELEPHONE ENCOUNTER
Patient emailed this office through [employer] stating that she has developed chest congestion, cough with production. Patient states that she is using her inhalers and her nebulizer. She states that she is using her nebulizer a \"couple of times a day\". Dr. Nikolay Kingston ordered patient levaquin and steroid taper. These scripts were sent to the [facility].

## 2022-05-02 ENCOUNTER — OFFICE VISIT (OUTPATIENT)
Dept: ENT CLINIC | Age: 32
End: 2022-05-02
Payer: COMMERCIAL

## 2022-05-02 VITALS
HEART RATE: 130 BPM | SYSTOLIC BLOOD PRESSURE: 141 MMHG | DIASTOLIC BLOOD PRESSURE: 88 MMHG | HEIGHT: 64 IN | BODY MASS INDEX: 17.93 KG/M2 | WEIGHT: 105 LBS

## 2022-05-02 DIAGNOSIS — J34.89 RHINORRHEA: ICD-10-CM

## 2022-05-02 DIAGNOSIS — H61.23 BILATERAL IMPACTED CERUMEN: ICD-10-CM

## 2022-05-02 DIAGNOSIS — J32.9 CHRONIC SINUSITIS, UNSPECIFIED LOCATION: Primary | ICD-10-CM

## 2022-05-02 DIAGNOSIS — R09.81 NASAL CONGESTION: ICD-10-CM

## 2022-05-02 DIAGNOSIS — J33.9 NASAL POLYP: ICD-10-CM

## 2022-05-02 PROCEDURE — 99213 OFFICE O/P EST LOW 20 MIN: CPT | Performed by: OTOLARYNGOLOGY

## 2022-05-02 NOTE — PROGRESS NOTES
[address] [place] [address]  FAMILY MEDICINE RESIDENCY PROGRAM  DATE OF VISIT : 2022    Patient : Emily Fatima   Age : 27 y.o.  : 1990   MRN : [id number]   ______________________________________________________________________    Chief Complaint :   Chief Complaint   Patient presents with    Follow-up     6 month F/U sinus       HPI : Emily Fatima is 27 y.o. female who presented to the clinic today for 6 month follow up, R ear cerumen impaction. Everything \"still pretty clear. \" Taking Flonase once nightly, bilateral. Saline 3-4 times daily. No headaches, sinus pressure, sore throat, epistaxis, fever, chills. R ear cerumen impaction per PCP in February. Reports pain, but no longer feels plugged. Denies hearing loss, drainage. Admits to occasional use of Q-tips. Past Medical History :  Past Medical History:   Diagnosis Date    Asthma     follows with Dr Juvenal Sneed Bronchiectasis [facility])     Nasal polyp     Osteoporosis     Osteoporosis 2014    Premature baby     Scoliosis 3/9/2015    Seasonal allergies     TMJ (temporomandibular joint disorder)     Vitamin D deficiency     Vitamin D deficiency 2014     Past Surgical History:   Procedure Laterality Date    PFT-LAB  2014    SINUS ENDOSCOPY N/A 2021    REVISION FUNCTIONAL ENDOSCOPIC SINUS SURGERY performed by Jodi Herrera DO at [facility] OR    SINUS SURGERY         Allergies :   No Known Allergies    Medication List :    Current Outpatient Medications   Medication Sig Dispense Refill    predniSONE (DELTASONE) 10 MG tablet Take 40mg x 3 days, 30mg x 3 days, 20mg x 3 days, 10mg x 3 days then stop.  30 tablet 0    albuterol (PROVENTIL) (2.5 MG/3ML) 0.083% nebulizer solution USE 1 VIAL VIA NEBULIZER EVERY 6 HOURS AS NEEDED WHEEZING 900 mL 5    fluticasone (FLONASE) 50 MCG/ACT nasal spray 2 sprays by Nasal route daily 2 sprays each nostril once daily 1 each 0    ondansetron (ZOFRAN ODT) 4 MG disintegrating tablet Place 1 tablet under the tongue every 8 hours as needed for Nausea or Vomiting 10 tablet 1    vitamin D (ERGOCALCIFEROL) 1.25 MG (40156 UT) CAPS capsule Take 1 capsule by mouth once a week 12 capsule 1    fluticasone-salmeterol (ADVAIR DISKUS) 500-50 MCG/DOSE diskus inhaler Inhale 1 puff into the lungs 2 times daily Rinse mouth after use. 1 Inhaler 12    ALLERGY RELIEF 180 MG tablet TAKE 1 TABLET BY MOUTH EVERY DAY 90 tablet 3    montelukast (SINGULAIR) 10 MG tablet Take 1 tablet by mouth daily 30 tablet 3    cetirizine (ZYRTEC) 10 MG tablet TAKE 1 TABLET BY MOUTH EVERY DAY 90 tablet 0    mometasone-formoterol (DULERA) 200-5 MCG/ACT inhaler Inhale 2 puffs into the lungs 2 times daily Rinse mouth after using. 1 Inhaler 12    Nebulizers (PORTABLE COMPRESSOR NEBULIZER) MISC Reason: Severe asthma and bronchiectasis 1 each 0    Nebulizers (VIOS AEROSOL DELIVERY SYSTEM) MISC USE AS DIRECTED 1 each 0    Calcium Carbonate-Vitamin D (OYSTER SHELL CALCIUM/D) 500-200 MG-UNIT TABS Take 1 tablet by mouth daily. 30 tablet 0    Calcium Carbonate-Vit D-Min (CALTRATE 600+D PLUS MINERALS) 600-800 MG-UNIT CHEW Take 600 mg by mouth in the morning and at bedtime 60 tablet 5    albuterol sulfate HFA (PROAIR HFA) 108 (90 Base) MCG/ACT inhaler Inhale 2 puffs into the lungs every 4 hours as needed for Wheezing or Shortness of Breath 1 Inhaler 12     No current facility-administered medications for this visit.        ______________________________________________________________________    Physical Exam :    Vitals: BP (!) 141/88   Pulse 130   Ht 5' 4\" (1.626 m)   Wt 105 lb (47.6 kg)   BMI 18.02 kg/m²   General Appearance: Awake, alert, oriented, and in NAD  HEENT: Bilateral nasal passages with some mucous, mild irritation. Throat without erythema, exudate. Bilateral cerumen impaction.   Neck: Symmetrical, trachea midline.     ______________________________________________________________________    Assessment & Plan :    Chronic sinusitis, unspecified location  Rhinorrhea  Nasal polyp  Nasal congestion  · Symptoms well controlled with intranasal saline 3-4 times daily and Flonase nightly, one spray each nostril  · Continue current management    Bilateral impacted cerumen  · Cerumen disimpacted in office today  · Debrox drops to soften wax      Additional plan and future considerations:       Return to Office: No follow-ups on file.     Neal Barrera DO   Case discussed with Dr. Nai Granados

## 2022-06-10 DIAGNOSIS — J32.9 CHRONIC SINUSITIS, UNSPECIFIED LOCATION: Primary | ICD-10-CM

## 2022-06-13 RX ORDER — MONTELUKAST SODIUM 10 MG/1
10 TABLET ORAL DAILY
Qty: 30 TABLET | Refills: 3 | Status: SHIPPED
Start: 2022-06-13 | End: 2022-10-17

## 2022-06-13 NOTE — TELEPHONE ENCOUNTER
Patient was last seen in office 5/2/22 patient would like a prescription refill for Singulair. Patient is scheduled to come back into the office 10/31/22.

## 2022-08-16 ENCOUNTER — TELEPHONE (OUTPATIENT)
Dept: ENT CLINIC | Age: 32
End: 2022-08-16

## 2022-08-16 DIAGNOSIS — J34.89 RHINORRHEA: Primary | ICD-10-CM

## 2022-08-16 RX ORDER — FEXOFENADINE HCL 180 MG/1
180 TABLET ORAL DAILY
Qty: 90 TABLET | Refills: 3 | Status: SHIPPED | OUTPATIENT
Start: 2022-08-16

## 2022-08-16 RX ORDER — FLUTICASONE PROPIONATE 50 MCG
2 SPRAY, SUSPENSION (ML) NASAL DAILY
Qty: 1 EACH | Refills: 3 | Status: SHIPPED | OUTPATIENT
Start: 2022-08-16

## 2022-08-16 NOTE — TELEPHONE ENCOUNTER
Christina Sultana was last seen in office 5/2/2022 patient would like a prescription refill for allergy relief. Patients next office visit is 10/31/22.

## 2022-09-19 RX ORDER — FLUTICASONE PROPIONATE AND SALMETEROL 500; 50 UG/1; UG/1
POWDER RESPIRATORY (INHALATION)
Qty: 60 EACH | Refills: 11 | Status: SHIPPED | OUTPATIENT
Start: 2022-09-19

## 2022-10-16 DIAGNOSIS — J32.9 CHRONIC SINUSITIS, UNSPECIFIED LOCATION: Primary | ICD-10-CM

## 2022-10-17 RX ORDER — MONTELUKAST SODIUM 10 MG/1
10 TABLET ORAL DAILY
Qty: 30 TABLET | Refills: 3 | Status: SHIPPED | OUTPATIENT
Start: 2022-10-17

## 2022-10-17 NOTE — TELEPHONE ENCOUNTER
Patient was last seen in office 11/1/2021 patient would like a prescription refill for montelukast patient is scheduled to come back into the office 10/21/2022.

## 2022-10-31 ENCOUNTER — OFFICE VISIT (OUTPATIENT)
Dept: ENT CLINIC | Age: 32
End: 2022-10-31
Payer: COMMERCIAL

## 2022-10-31 VITALS
HEIGHT: 64 IN | DIASTOLIC BLOOD PRESSURE: 81 MMHG | HEART RATE: 94 BPM | WEIGHT: 105 LBS | BODY MASS INDEX: 17.93 KG/M2 | SYSTOLIC BLOOD PRESSURE: 128 MMHG

## 2022-10-31 DIAGNOSIS — J34.89 RHINORRHEA: ICD-10-CM

## 2022-10-31 DIAGNOSIS — H61.23 BILATERAL IMPACTED CERUMEN: Primary | ICD-10-CM

## 2022-10-31 DIAGNOSIS — J33.9 NASAL POLYP: ICD-10-CM

## 2022-10-31 DIAGNOSIS — R09.81 NASAL CONGESTION: ICD-10-CM

## 2022-10-31 PROCEDURE — 99214 OFFICE O/P EST MOD 30 MIN: CPT | Performed by: OTOLARYNGOLOGY

## 2022-10-31 PROCEDURE — 69210 REMOVE IMPACTED EAR WAX UNI: CPT | Performed by: OTOLARYNGOLOGY

## 2022-10-31 RX ORDER — FLUTICASONE PROPIONATE 50 MCG
2 SPRAY, SUSPENSION (ML) NASAL DAILY
Qty: 1 EACH | Refills: 5 | Status: SHIPPED | OUTPATIENT
Start: 2022-10-31

## 2022-10-31 RX ORDER — AMOXICILLIN 500 MG/1
500 CAPSULE ORAL 2 TIMES DAILY
Qty: 20 CAPSULE | Refills: 0 | Status: SHIPPED | OUTPATIENT
Start: 2022-10-31 | End: 2022-11-10

## 2022-10-31 ASSESSMENT — ENCOUNTER SYMPTOMS
SORE THROAT: 0
SINUS PRESSURE: 1
RHINORRHEA: 1
VOMITING: 0
SHORTNESS OF BREATH: 0
TROUBLE SWALLOWING: 0
VOICE CHANGE: 0
COUGH: 0

## 2022-10-31 NOTE — PROGRESS NOTES
x 3 days then stop., Disp: 30 tablet, Rfl: 0    albuterol (PROVENTIL) (2.5 MG/3ML) 0.083% nebulizer solution, USE 1 VIAL VIA NEBULIZER EVERY 6 HOURS AS NEEDED WHEEZING, Disp: 900 mL, Rfl: 5    fluticasone (FLONASE) 50 MCG/ACT nasal spray, 2 sprays by Nasal route daily 2 sprays each nostril once daily, Disp: 1 each, Rfl: 0    ondansetron (ZOFRAN ODT) 4 MG disintegrating tablet, Place 1 tablet under the tongue every 8 hours as needed for Nausea or Vomiting, Disp: 10 tablet, Rfl: 1    vitamin D (ERGOCALCIFEROL) 1.25 MG (95842 UT) CAPS capsule, Take 1 capsule by mouth once a week, Disp: 12 capsule, Rfl: 1    cetirizine (ZYRTEC) 10 MG tablet, TAKE 1 TABLET BY MOUTH EVERY DAY, Disp: 90 tablet, Rfl: 0    mometasone-formoterol (DULERA) 200-5 MCG/ACT inhaler, Inhale 2 puffs into the lungs 2 times daily Rinse mouth after using., Disp: 1 Inhaler, Rfl: 12    Nebulizers (PORTABLE COMPRESSOR NEBULIZER) MISC, Reason: Severe asthma and bronchiectasis, Disp: 1 each, Rfl: 0    Nebulizers (VIOS AEROSOL DELIVERY SYSTEM) MISC, USE AS DIRECTED, Disp: 1 each, Rfl: 0    Calcium Carbonate-Vitamin D (OYSTER SHELL CALCIUM/D) 500-200 MG-UNIT TABS, Take 1 tablet by mouth daily. , Disp: 30 tablet, Rfl: 0    Calcium Carbonate-Vit D-Min (CALTRATE 600+D PLUS MINERALS) 600-800 MG-UNIT CHEW, Take 600 mg by mouth in the morning and at bedtime, Disp: 60 tablet, Rfl: 5    albuterol sulfate HFA (PROAIR HFA) 108 (90 Base) MCG/ACT inhaler, Inhale 2 puffs into the lungs every 4 hours as needed for Wheezing or Shortness of Breath, Disp: 1 Inhaler, Rfl: 12  Patient has no known allergies. Social History     Tobacco Use    Smoking status: Never    Smokeless tobacco: Never   Vaping Use    Vaping Use: Never used   Substance Use Topics    Alcohol use: No    Drug use: No     Family History   Problem Relation Age of Onset    Asthma Sister     Asthma Sister        Review of Systems   Constitutional:  Negative for chills and fever.    HENT:  Positive for congestion, postnasal drip, rhinorrhea and sinus pressure. Negative for ear discharge, hearing loss, sore throat, trouble swallowing and voice change. Respiratory:  Negative for cough and shortness of breath. Cardiovascular:  Negative for chest pain and palpitations. Gastrointestinal:  Negative for vomiting. Skin:  Negative for rash. Allergic/Immunologic: Negative for environmental allergies. Neurological:  Negative for dizziness and headaches. Hematological:  Does not bruise/bleed easily. All other systems reviewed and are negative. /81 (Site: Left Upper Arm, Position: Sitting, Cuff Size: Medium Adult)   Pulse 94   Ht 5' 4\" (1.626 m)   Wt 105 lb (47.6 kg)   LMP 09/29/2022   BMI 18.02 kg/m²   Physical Exam  Vitals and nursing note reviewed. Constitutional:       Appearance: She is well-developed. HENT:      Head: Normocephalic and atraumatic. No contusion, masses or laceration. Jaw: No tenderness or swelling. Right Ear: Tympanic membrane and ear canal normal. There is impacted cerumen. Left Ear: Tympanic membrane and ear canal normal. There is impacted cerumen. Nose: Congestion and rhinorrhea present. Rhinorrhea is clear. Right Nostril: No epistaxis. Left Nostril: No epistaxis. Right Turbinates: Swollen. Left Turbinates: Swollen. Comments: No sign of polyp regrowth. Mouth/Throat:      Mouth: Mucous membranes are moist.      Pharynx: No oropharyngeal exudate or posterior oropharyngeal erythema. Eyes:      Pupils: Pupils are equal, round, and reactive to light. Neck:      Thyroid: No thyromegaly. Trachea: No tracheal deviation. Cardiovascular:      Rate and Rhythm: Normal rate. Pulmonary:      Effort: Pulmonary effort is normal. No respiratory distress. Musculoskeletal:         General: Normal range of motion. Cervical back: Normal range of motion. Lymphadenopathy:      Cervical: No cervical adenopathy.    Skin:     General: Skin is warm. Findings: No erythema. Neurological:      Mental Status: She is alert. Cranial Nerves: No cranial nerve deficit. Procedure: Cerumen Impaction    Pre-op: Cerumen Impaction    Post Op: Same    Procedure: Cerumen Impaction removal    Surgeon: Rosalee Patterson    Procedure: Under microscopic assistance large cerumen impaction was removed using gentle suction and curette. TM intact B/L, Pt tolerated procedure well    Complications: None    Blood Loss Minimial      IMPRESSION/PLAN:  Bilateral cerumen impaction removed without complications  Amoxil 368 bid x 10 days for sinus  Follow up 6 months for recheck of sinus and cerumen or sooner if symptoms do not resolve. Continue debrox as previously doing     Dr. Royce Feliciano.  Otolaryngology Facial Plastic Surgery  :  [facility] Otolaryngology/Facial Plastic Surgery Residency  Associate Clinical Professor:  Jay Cooney [facility]

## 2022-12-05 ENCOUNTER — OFFICE VISIT (OUTPATIENT)
Dept: FAMILY MEDICINE CLINIC | Age: 32
End: 2022-12-05
Payer: COMMERCIAL

## 2022-12-05 VITALS
HEIGHT: 64 IN | WEIGHT: 109 LBS | OXYGEN SATURATION: 97 % | TEMPERATURE: 97.1 F | SYSTOLIC BLOOD PRESSURE: 118 MMHG | DIASTOLIC BLOOD PRESSURE: 72 MMHG | BODY MASS INDEX: 18.61 KG/M2 | HEART RATE: 93 BPM

## 2022-12-05 DIAGNOSIS — H66.002 NON-RECURRENT ACUTE SUPPURATIVE OTITIS MEDIA OF LEFT EAR WITHOUT SPONTANEOUS RUPTURE OF TYMPANIC MEMBRANE: ICD-10-CM

## 2022-12-05 DIAGNOSIS — E55.9 VITAMIN D DEFICIENCY: ICD-10-CM

## 2022-12-05 DIAGNOSIS — E53.8 B12 DEFICIENCY: ICD-10-CM

## 2022-12-05 DIAGNOSIS — H61.23 IMPACTED CERUMEN OF BOTH EARS: ICD-10-CM

## 2022-12-05 DIAGNOSIS — R42 DIZZINESS: ICD-10-CM

## 2022-12-05 DIAGNOSIS — R42 DIZZINESS: Primary | ICD-10-CM

## 2022-12-05 LAB
BASOPHILS ABSOLUTE: 0.03 E9/L (ref 0–0.2)
BASOPHILS RELATIVE PERCENT: 0.3 % (ref 0–2)
EOSINOPHILS ABSOLUTE: 0.08 E9/L (ref 0.05–0.5)
EOSINOPHILS RELATIVE PERCENT: 0.8 % (ref 0–6)
FOLATE: 14.9 NG/ML (ref 4.8–24.2)
HCT VFR BLD CALC: 35.5 % (ref 34–48)
HEMOGLOBIN: 10.4 G/DL (ref 11.5–15.5)
IMMATURE GRANULOCYTES #: 0.03 E9/L
IMMATURE GRANULOCYTES %: 0.3 % (ref 0–5)
LYMPHOCYTES ABSOLUTE: 2.37 E9/L (ref 1.5–4)
LYMPHOCYTES RELATIVE PERCENT: 23.5 % (ref 20–42)
MCH RBC QN AUTO: 22.7 PG (ref 26–35)
MCHC RBC AUTO-ENTMCNC: 29.3 % (ref 32–34.5)
MCV RBC AUTO: 77.5 FL (ref 80–99.9)
MONOCYTES ABSOLUTE: 0.63 E9/L (ref 0.1–0.95)
MONOCYTES RELATIVE PERCENT: 6.3 % (ref 2–12)
NEUTROPHILS ABSOLUTE: 6.94 E9/L (ref 1.8–7.3)
NEUTROPHILS RELATIVE PERCENT: 68.8 % (ref 43–80)
PDW BLD-RTO: 15.6 FL (ref 11.5–15)
PLATELET # BLD: 428 E9/L (ref 130–450)
PMV BLD AUTO: 10.1 FL (ref 7–12)
RBC # BLD: 4.58 E12/L (ref 3.5–5.5)
VITAMIN B-12: 653 PG/ML (ref 211–946)
VITAMIN D 25-HYDROXY: 12 NG/ML (ref 30–100)
WBC # BLD: 10.1 E9/L (ref 4.5–11.5)

## 2022-12-05 PROCEDURE — 99214 OFFICE O/P EST MOD 30 MIN: CPT | Performed by: INTERNAL MEDICINE

## 2022-12-05 PROCEDURE — 69210 REMOVE IMPACTED EAR WAX UNI: CPT | Performed by: INTERNAL MEDICINE

## 2022-12-05 RX ORDER — AMOXICILLIN AND CLAVULANATE POTASSIUM 875; 125 MG/1; MG/1
1 TABLET, FILM COATED ORAL 2 TIMES DAILY
Qty: 14 TABLET | Refills: 0 | Status: SHIPPED | OUTPATIENT
Start: 2022-12-05 | End: 2022-12-12

## 2022-12-05 RX ORDER — MECLIZINE HYDROCHLORIDE 25 MG/1
25 TABLET ORAL 3 TIMES DAILY PRN
Qty: 15 TABLET | Refills: 0 | Status: SHIPPED | OUTPATIENT
Start: 2022-12-05 | End: 2022-12-15

## 2022-12-05 NOTE — PROGRESS NOTES
[facility] PR[facility]  [address] [place] [address]  [place] [address]  Dept: [phone]  Dept Fax: [phone]     NAME: Luis Ching        :  1990        MRN:  [id number]    Chief Complaint   Patient presents with    Dizziness       Subjective     History of Present Illness  Luis Ching is a 34 y.o. female who presents today for an acute visit with the complaint of dizziness. Has been dizzy since having a viral illness a few weeks ago. Gets recurrent cerumen impactions, most recently cleared out by ENT at the end of October    Review of Systems  Please see HPI above. All bolded are positive. Gen: fever, chills, fatigue, weakness, diaphoresis, unintentional weight change  Head: headache, vision change, hearing loss  Chest: chest pain/heaviness, palpitations  Lungs: shortness of breath, wheezing, coughing, hemoptysis  Abdomen: abdominal pain, nausea, vomiting, diarrhea, constipation, melena, hematochezia, hematemesis, loss of appetite  Extremities: lower extremity edema, myalgias, arthralgias  Urinary: dysuria, hematuria, weak flow, increase in frequency  Neurologic: lightheadedness, dizziness, confusion, syncope  Endocrine: polydipsia, polyuria, heat or cold intolerance  Psychiatric: depression, suicidal ideation, anxiety  Derm: Rashes, ulcers, burns    Home Medications:  Current Outpatient Medications   Medication Sig Dispense Refill    meclizine (ANTIVERT) 25 MG tablet Take 1 tablet by mouth 3 times daily as needed for Dizziness 15 tablet 0    amoxicillin-clavulanate (AUGMENTIN) 875-125 MG per tablet Take 1 tablet by mouth 2 times daily for 7 days 14 tablet 0    montelukast (SINGULAIR) 10 MG tablet TAKE 1 TABLET BY MOUTH DAILY 30 tablet 3    WIXELA INHUB 500-50 MCG/ACT AEPB diskus inhaler INHALE 1 PUFF INTO THE LUNGS TWICE DAILY. RINSE MOUTH AFTER USE 60 each 11    fexofenadine (ALLERGY RELIEF) 180 MG tablet Take 1 tablet by mouth in the morning.  80 tablet 3    Calcium Carbonate-Vit D-Min (CALTRATE 600+D PLUS MINERALS) 600-800 MG-UNIT CHEW Take 600 mg by mouth in the morning and at bedtime 60 tablet 5    albuterol (PROVENTIL) (2.5 MG/3ML) 0.083% nebulizer solution USE 1 VIAL VIA NEBULIZER EVERY 6 HOURS AS NEEDED WHEEZING 900 mL 5    fluticasone (FLONASE) 50 MCG/ACT nasal spray 2 sprays by Nasal route daily 2 sprays each nostril once daily 1 each 0    vitamin D (ERGOCALCIFEROL) 1.25 MG (13973 UT) CAPS capsule Take 1 capsule by mouth once a week 12 capsule 1    albuterol sulfate HFA (PROAIR HFA) 108 (90 Base) MCG/ACT inhaler Inhale 2 puffs into the lungs every 4 hours as needed for Wheezing or Shortness of Breath 1 Inhaler 12    cetirizine (ZYRTEC) 10 MG tablet TAKE 1 TABLET BY MOUTH EVERY DAY 90 tablet 0    Nebulizers (PORTABLE COMPRESSOR NEBULIZER) MISC Reason: Severe asthma and bronchiectasis 1 each 0    Nebulizers (VIOS AEROSOL DELIVERY SYSTEM) MISC USE AS DIRECTED 1 each 0    ferrous sulfate (IRON 325) 325 (65 Fe) MG tablet Take 1 tablet by mouth 2 times daily 180 tablet 1    mometasone-formoterol (DULERA) 200-5 MCG/ACT inhaler Inhale 2 puffs into the lungs 2 times daily Rinse mouth after using. (Patient not taking: Reported on 12/5/2022) 1 Inhaler 12     No current facility-administered medications for this visit. Allergies:  No Known Allergies    Objective     Vitals:    12/05/22 1444   BP: 118/72   Pulse: 93   Temp: 97.1 °F (36.2 °C)   TempSrc: Temporal   SpO2: 97%   Weight: 109 lb (49.4 kg)   Height: 5' 4\" (1.626 m)        Physical Exam  General: Awake, alert, and oriented to person, place, time, and purpose, appears stated age and cooperative, No acute distress  Head: Normocephalic, atraumatic  Eyes: conjunctivae/corneas clear, EOMI  Mouth: Mucous membranes moist with no pharyngeal exudate or erythema  Neck: no JVD, no adenopathy, no carotid bruit, supple, symmetrical, trachea midline  Back: symmetric, ROM normal, No CVA tenderness.   Lungs: clear to auscultation bilaterally without wheezes, rales, or rhonchi  Heart: regular rate and rhythm, S1, S2 normal, no murmur, click, rub or gallop  Abdomen: soft, non-tender; bowel sounds normal; no masses,  no organomegaly  Extremities: atraumatic, no cyanosis, no edema  Skin: color, texture, turgor within normal limits. No rashes or lesions or normal  Neurologic:speech appropriate, moves all 4 extremities, normal muscle strength and tone, CN 2-12 grossly intact    Ear lavage performed on the bilateral ears, symptoms resolved post-lavage. TMs with erythema present, but no effusion, or perforation. Pt advised to avoid Q-tip use to prevent recurrence. ED sooner if symptoms worsen or change. Assessment and Plan     Patient is a 34 y.o. female who presented to the office for an acute visit. Ron Aguero was seen today for dizziness. Diagnoses and all orders for this visit:    Dizziness  -     Comprehensive Metabolic Panel; Future  -     Vitamin D 25 Hydroxy; Future  -     Vitamin B12 & Folate; Future  -     CBC with Auto Differential; Future  -     meclizine (ANTIVERT) 25 MG tablet; Take 1 tablet by mouth 3 times daily as needed for Dizziness  -     Iron and TIBC; Future  -     Ear wax removal    B12 deficiency  -     Vitamin B12 & Folate; Future    Vitamin D deficiency  -     Vitamin D 25 Hydroxy; Future    Non-recurrent acute suppurative otitis media of left ear without spontaneous rupture of tympanic membrane  -     amoxicillin-clavulanate (AUGMENTIN) 875-125 MG per tablet; Take 1 tablet by mouth 2 times daily for 7 days    Impacted cerumen of both ears  -     Ear wax removal      Educational materials and/or home exercises printed for patient's review and were included in patient instructions on his/her After Visit Summary and given to patient at the end of visit. Counseled regarding above diagnosis, including possible risks and complications, especially if left uncontrolled or untreated.    Advised to present to the Emergency Department if symptoms worsen. Counseled regarding the possible side effects, risks, benefits and alternatives to treatment; patient and/or guardian verbalizes understanding, agrees, feels comfortable with and wishes to proceed with above treatment plan. Advised patient to call Annie Srinivasan new medication issues, and read all Rx info from pharmacy to assure aware of all possible risks and side effects of any medication before taking. Patient verbalizes understanding and agrees with above counseling, assessment and plan. All questions answered.     Meghana Rosenbaum, DO

## 2022-12-06 LAB
ALBUMIN SERPL-MCNC: 4.4 G/DL (ref 3.5–5.2)
ALP BLD-CCNC: 69 U/L (ref 35–104)
ALT SERPL-CCNC: 9 U/L (ref 0–32)
ANION GAP SERPL CALCULATED.3IONS-SCNC: 15 MMOL/L (ref 7–16)
AST SERPL-CCNC: 18 U/L (ref 0–31)
BILIRUB SERPL-MCNC: 0.3 MG/DL (ref 0–1.2)
BUN BLDV-MCNC: 13 MG/DL (ref 6–20)
CALCIUM SERPL-MCNC: 9.8 MG/DL (ref 8.6–10.2)
CHLORIDE BLD-SCNC: 100 MMOL/L (ref 98–107)
CO2: 23 MMOL/L (ref 22–29)
CREAT SERPL-MCNC: 0.6 MG/DL (ref 0.5–1)
GFR SERPL CREATININE-BSD FRML MDRD: >60 ML/MIN/1.73
GLUCOSE BLD-MCNC: 83 MG/DL (ref 74–99)
IRON SATURATION: 4 % (ref 15–50)
IRON: 18 MCG/DL (ref 37–145)
POTASSIUM SERPL-SCNC: 4.2 MMOL/L (ref 3.5–5)
SODIUM BLD-SCNC: 138 MMOL/L (ref 132–146)
TOTAL IRON BINDING CAPACITY: 427 MCG/DL (ref 250–450)
TOTAL PROTEIN: 8.1 G/DL (ref 6.4–8.3)

## 2022-12-07 DIAGNOSIS — D50.9 IRON DEFICIENCY ANEMIA, UNSPECIFIED IRON DEFICIENCY ANEMIA TYPE: Primary | ICD-10-CM

## 2022-12-07 RX ORDER — FERROUS SULFATE 325(65) MG
325 TABLET ORAL 2 TIMES DAILY
Qty: 180 TABLET | Refills: 1 | Status: SHIPPED | OUTPATIENT
Start: 2022-12-07

## 2022-12-15 RX ORDER — ALBUTEROL SULFATE 2.5 MG/3ML
SOLUTION RESPIRATORY (INHALATION)
Qty: 900 ML | Refills: 5 | Status: SHIPPED | OUTPATIENT
Start: 2022-12-15

## 2023-03-13 ENCOUNTER — OFFICE VISIT (OUTPATIENT)
Dept: FAMILY MEDICINE CLINIC | Age: 33
End: 2023-03-13
Payer: COMMERCIAL

## 2023-03-13 VITALS
SYSTOLIC BLOOD PRESSURE: 108 MMHG | RESPIRATION RATE: 16 BRPM | DIASTOLIC BLOOD PRESSURE: 68 MMHG | WEIGHT: 110 LBS | TEMPERATURE: 97.7 F | HEART RATE: 97 BPM | OXYGEN SATURATION: 97 % | HEIGHT: 64 IN | BODY MASS INDEX: 18.78 KG/M2

## 2023-03-13 DIAGNOSIS — J45.31 MILD PERSISTENT ASTHMA WITH EXACERBATION: ICD-10-CM

## 2023-03-13 DIAGNOSIS — B34.9 VIRAL ILLNESS: Primary | ICD-10-CM

## 2023-03-13 DIAGNOSIS — H61.21 IMPACTED CERUMEN OF RIGHT EAR: ICD-10-CM

## 2023-03-13 DIAGNOSIS — R05.1 ACUTE COUGH: ICD-10-CM

## 2023-03-13 DIAGNOSIS — J02.9 SORE THROAT: ICD-10-CM

## 2023-03-13 LAB — S PYO AG THROAT QL: NORMAL

## 2023-03-13 PROCEDURE — 87880 STREP A ASSAY W/OPTIC: CPT | Performed by: NURSE PRACTITIONER

## 2023-03-13 PROCEDURE — 99213 OFFICE O/P EST LOW 20 MIN: CPT | Performed by: NURSE PRACTITIONER

## 2023-03-13 RX ORDER — PREDNISONE 20 MG/1
20 TABLET ORAL 2 TIMES DAILY
Qty: 10 TABLET | Refills: 0 | Status: SHIPPED | OUTPATIENT
Start: 2023-03-13 | End: 2023-03-18

## 2023-03-13 RX ORDER — BROMPHENIRAMINE MALEATE, PSEUDOEPHEDRINE HYDROCHLORIDE, AND DEXTROMETHORPHAN HYDROBROMIDE 2; 30; 10 MG/5ML; MG/5ML; MG/5ML
10 SYRUP ORAL EVERY 6 HOURS PRN
Qty: 240 ML | Refills: 0 | Status: SHIPPED | OUTPATIENT
Start: 2023-03-13

## 2023-03-13 NOTE — PROGRESS NOTES
3/13/23  Symone Contreras : 1990 Sex: female  Age 34 y.o. Subjective:  Chief Complaint   Patient presents with    Cough     Symptoms started last Thursday. She has tried dayquil and nyquil. Chest Congestion    Otalgia     Right ear    Pharyngitis    Dizziness       HPI:   Symone Contreras , 34 y.o. female presents to the clinic for evaluation of cough x 4 days. The patient also reports wheezing, sore throat, intermittent right ear discomfort, and intermittent dizziness. The patient has taken Nyquil for symptoms. The patient reports improving symptoms over time. The patient denies known ill exposure. The patient denies headache, rash, and fever. The patient also denies chest pain, abdominal pain, shortness of breath, and nausea / vomiting / diarrhea. ROS:   Unless otherwise stated in this report the patient's positive and negative responses for review of systems for constitutional, eyes, ENT, cardiovascular, respiratory, gastrointestinal, neurological, , musculoskeletal, and integument systems and related systems to the presenting problem are either stated in the history of present illness or were not pertinent or were negative for the symptoms and/or complaints related to the presenting medical problem. Positives and pertinent negatives as per HPI. All others reviewed and are negative.       PMH:     Past Medical History:   Diagnosis Date    Asthma     follows with Dr Eugenia Steele    Bronchiectasis [facility])     Nasal polyp     Osteoporosis     Osteoporosis 2014    Premature baby     Scoliosis 3/9/2015    Seasonal allergies     TMJ (temporomandibular joint disorder)     Vitamin D deficiency     Vitamin D deficiency 2014       Past Surgical History:   Procedure Laterality Date    PFT-LAB  2014    SINUS ENDOSCOPY N/A 2021    REVISION FUNCTIONAL ENDOSCOPIC SINUS SURGERY performed by Sarthak Viramontes DO at [facility] OR    SINUS SURGERY         Family History   Problem Relation Age of Onset Asthma Sister     Asthma Sister        Medications:     Current Outpatient Medications:     predniSONE (DELTASONE) 20 MG tablet, Take 1 tablet by mouth 2 times daily for 5 days, Disp: 10 tablet, Rfl: 0    brompheniramine-pseudoephedrine-DM (BROMFED DM) 2-30-10 MG/5ML syrup, Take 10 mLs by mouth every 6 hours as needed for Congestion or Cough, Disp: 240 mL, Rfl: 0    montelukast (SINGULAIR) 10 MG tablet, TAKE 1 TABLET BY MOUTH DAILY, Disp: 30 tablet, Rfl: 3    albuterol (PROVENTIL) (2.5 MG/3ML) 0.083% nebulizer solution, USE 1 VIAL VIA NEBULIZER EVERY 6 HOURS AS NEEDED FOR WHEEZING, Disp: 900 mL, Rfl: 5    ferrous sulfate (IRON 325) 325 (65 Fe) MG tablet, Take 1 tablet by mouth 2 times daily, Disp: 180 tablet, Rfl: 1    WIXELA INHUB 500-50 MCG/ACT AEPB diskus inhaler, INHALE 1 PUFF INTO THE LUNGS TWICE DAILY.  RINSE MOUTH AFTER USE, Disp: 60 each, Rfl: 11    fexofenadine (ALLERGY RELIEF) 180 MG tablet, Take 1 tablet by mouth in the morning., Disp: 90 tablet, Rfl: 3    fluticasone (FLONASE) 50 MCG/ACT nasal spray, 2 sprays by Nasal route daily 2 sprays each nostril once daily, Disp: 1 each, Rfl: 0    vitamin D (ERGOCALCIFEROL) 1.25 MG (68740 UT) CAPS capsule, Take 1 capsule by mouth once a week, Disp: 12 capsule, Rfl: 1    albuterol sulfate HFA (PROAIR HFA) 108 (90 Base) MCG/ACT inhaler, Inhale 2 puffs into the lungs every 4 hours as needed for Wheezing or Shortness of Breath, Disp: 1 Inhaler, Rfl: 12    Nebulizers (PORTABLE COMPRESSOR NEBULIZER) MISC, Reason: Severe asthma and bronchiectasis, Disp: 1 each, Rfl: 0    Calcium Carbonate-Vit D-Min (CALTRATE 600+D PLUS MINERALS) 600-800 MG-UNIT CHEW, Take 600 mg by mouth in the morning and at bedtime, Disp: 60 tablet, Rfl: 5    Nebulizers (VIOS AEROSOL DELIVERY SYSTEM) [facility], USE AS DIRECTED (Patient not taking: Reported on 3/13/2023), Disp: 1 each, Rfl: 0    Allergies:   No Known Allergies    Social History:     Social History     Tobacco Use    Smoking status: Never Smokeless tobacco: Never   Vaping Use    Vaping Use: Never used   Substance Use Topics    Alcohol use: No    Drug use: No       Physical Exam:     Vitals:    03/13/23 1253   BP: 108/68   Pulse: 97   Resp: 16   Temp: 97.7 °F (36.5 °C)   TempSrc: Temporal   SpO2: 97%   Weight: 110 lb (49.9 kg)   Height: 5' 4\" (1.626 m)       Physical Exam (PE)    Physical Exam  Constitutional:       Appearance: Normal appearance. HENT:      Head: Normocephalic. Right Ear: Tympanic membrane, ear canal and external ear normal.      Left Ear: Tympanic membrane, ear canal and external ear normal.      Nose: Congestion and rhinorrhea present. Mouth/Throat:      Mouth: Mucous membranes are moist.      Pharynx: Oropharynx is clear. Posterior oropharyngeal erythema present. No oropharyngeal exudate. Eyes:      Pupils: Pupils are equal, round, and reactive to light. Cardiovascular:      Rate and Rhythm: Normal rate and regular rhythm. Pulses: Normal pulses. Heart sounds: Normal heart sounds. Pulmonary:      Effort: Pulmonary effort is normal.      Breath sounds: Normal breath sounds. No wheezing, rhonchi or rales. Abdominal:      General: Bowel sounds are normal.      Palpations: Abdomen is soft. Musculoskeletal:         General: Normal range of motion. Cervical back: Normal range of motion and neck supple. Lymphadenopathy:      Cervical: No cervical adenopathy. Skin:     General: Skin is warm and dry. Capillary Refill: Capillary refill takes less than 2 seconds. Neurological:      General: No focal deficit present. Mental Status: She is alert and oriented to person, place, and time.    Psychiatric:         Mood and Affect: Mood normal.         Behavior: Behavior normal.        Testing:   (All laboratory and radiology results have been personally reviewed by myself)  Labs:  Results for orders placed or performed in visit on 03/13/23   POCT rapid strep A   Result Value Ref Range    Strep A Ag None Detected None Detected       Imaging: All Radiology results interpreted by Radiologist unless otherwise noted. No orders to display       Assessment / Plan:   The patient's vitals, allergies, medications, and past medical history have been reviewed. Fadi Rodriguez was seen today for cough, chest congestion, otalgia, pharyngitis and dizziness. Diagnoses and all orders for this visit:    Viral illness    Mild persistent asthma with exacerbation  -     predniSONE (DELTASONE) 20 MG tablet; Take 1 tablet by mouth 2 times daily for 5 days    Impacted cerumen of right ear    Sore throat  -     POCT rapid strep A    Acute cough  -     POCT rapid strep A  -     brompheniramine-pseudoephedrine-DM (BROMFED DM) 2-30-10 MG/5ML syrup; Take 10 mLs by mouth every 6 hours as needed for Congestion or Cough      - Disposition: Home    - Educational material printed for patient's review and were included in patient instructions. After Visit Summary was given to patient at the end of visit. - Albuterol inhaler 2 puffs qid x 5 days. Pt to use Debrox as directed for cerumen impaction.    - COVID-19 test declined today. Encouraged oral fluids and rest. Discussed symptomatic treatments with patient today. The patient is to follow-up with PCP in the next 2-3 days for reevaluation. Red flag symptoms were also discussed with the patient today. If symptoms worsen the patient is to go directly to the emergency department for reevaluation and treatment. Pt verbalizes understanding and is in agreement with plan of care. All questions answered. SIGNATURE: Tenisha Paul, APRN-CNP    *NOTE: This report was transcribed using voice recognition software. Every effort was made to ensure accuracy; however, inadvertent computerized transcription errors may be present.

## 2023-03-17 DIAGNOSIS — J20.9 ACUTE BRONCHITIS, UNSPECIFIED ORGANISM: Primary | ICD-10-CM

## 2023-03-17 RX ORDER — PREDNISONE 10 MG/1
10 TABLET ORAL DAILY
Qty: 30 TABLET | Refills: 0 | Status: SHIPPED | OUTPATIENT
Start: 2023-03-17 | End: 2023-04-16

## 2023-03-17 RX ORDER — AMOXICILLIN AND CLAVULANATE POTASSIUM 875; 125 MG/1; MG/1
1 TABLET, FILM COATED ORAL 2 TIMES DAILY WITH MEALS
Qty: 20 TABLET | Refills: 0 | Status: SHIPPED | OUTPATIENT
Start: 2023-03-17 | End: 2023-03-27

## 2023-04-24 ENCOUNTER — OFFICE VISIT (OUTPATIENT)
Dept: ENT CLINIC | Age: 33
End: 2023-04-24
Payer: COMMERCIAL

## 2023-04-24 VITALS
HEIGHT: 64 IN | SYSTOLIC BLOOD PRESSURE: 141 MMHG | DIASTOLIC BLOOD PRESSURE: 91 MMHG | BODY MASS INDEX: 18.78 KG/M2 | WEIGHT: 110 LBS | HEART RATE: 89 BPM

## 2023-04-24 DIAGNOSIS — J33.9 NASAL POLYP: Primary | ICD-10-CM

## 2023-04-24 DIAGNOSIS — J32.9 CHRONIC SINUSITIS, UNSPECIFIED LOCATION: ICD-10-CM

## 2023-04-24 DIAGNOSIS — H61.23 BILATERAL IMPACTED CERUMEN: ICD-10-CM

## 2023-04-24 DIAGNOSIS — R09.81 NASAL CONGESTION: ICD-10-CM

## 2023-04-24 PROCEDURE — 99213 OFFICE O/P EST LOW 20 MIN: CPT | Performed by: OTOLARYNGOLOGY

## 2023-04-24 RX ORDER — FLUTICASONE PROPIONATE 50 MCG
2 SPRAY, SUSPENSION (ML) NASAL DAILY
Qty: 1 EACH | Refills: 3 | Status: SHIPPED | OUTPATIENT
Start: 2023-04-24

## 2023-04-24 RX ORDER — MONTELUKAST SODIUM 10 MG/1
10 TABLET ORAL DAILY
Qty: 30 TABLET | Refills: 3 | Status: SHIPPED | OUTPATIENT
Start: 2023-04-24

## 2023-05-11 ASSESSMENT — ENCOUNTER SYMPTOMS
SINUS PAIN: 0
VOMITING: 0
COUGH: 0
RHINORRHEA: 0
SHORTNESS OF BREATH: 0

## 2023-05-31 DIAGNOSIS — D50.9 IRON DEFICIENCY ANEMIA, UNSPECIFIED IRON DEFICIENCY ANEMIA TYPE: ICD-10-CM

## 2023-05-31 RX ORDER — FERROUS SULFATE 325(65) MG
TABLET ORAL
Qty: 180 TABLET | Refills: 1 | Status: SHIPPED | OUTPATIENT
Start: 2023-05-31

## 2023-05-31 NOTE — TELEPHONE ENCOUNTER
Last Appointment:  12/5/2022  Future Appointments   Date Time Provider Alice Benitez   11/6/2023 10:30 AM [facility]

## 2023-07-25 ENCOUNTER — TELEPHONE (OUTPATIENT)
Dept: ENT CLINIC | Age: 33
End: 2023-07-25

## 2023-07-25 DIAGNOSIS — J34.89 RHINORRHEA: ICD-10-CM

## 2023-07-26 RX ORDER — FEXOFENADINE HYDROCHLORIDE 180 MG/1
TABLET, FILM COATED ORAL
Qty: 90 TABLET | Refills: 3 | Status: SHIPPED | OUTPATIENT
Start: 2023-07-26

## 2023-08-28 ENCOUNTER — TELEPHONE (OUTPATIENT)
Dept: PULMONOLOGY | Age: 33
End: 2023-08-28

## 2023-08-28 DIAGNOSIS — J45.909 UNCOMPLICATED ASTHMA, UNSPECIFIED ASTHMA SEVERITY, UNSPECIFIED WHETHER PERSISTENT: ICD-10-CM

## 2023-08-28 DIAGNOSIS — J47.9 BRONCHIECTASIS WITHOUT COMPLICATION (HCC): Primary | ICD-10-CM

## 2023-08-28 RX ORDER — FLUTICASONE PROPIONATE AND SALMETEROL 500; 50 UG/1; UG/1
1 POWDER RESPIRATORY (INHALATION) EVERY 12 HOURS
Qty: 60 EACH | Refills: 3 | Status: SHIPPED | OUTPATIENT
Start: 2023-08-28

## 2023-08-28 NOTE — TELEPHONE ENCOUNTER
Patient called and said Xiomara Hard was denied, it needs an Denise Breanna. [facility] [address]

## 2023-09-26 ENCOUNTER — TELEPHONE (OUTPATIENT)
Dept: ENT CLINIC | Age: 33
End: 2023-09-26

## 2023-09-27 RX ORDER — MONTELUKAST SODIUM 10 MG/1
10 TABLET ORAL DAILY
Qty: 30 TABLET | Refills: 3 | Status: SHIPPED | OUTPATIENT
Start: 2023-09-27

## 2023-09-28 RX ORDER — FLUTICASONE PROPIONATE 50 MCG
2 SPRAY, SUSPENSION (ML) NASAL DAILY
Qty: 1 EACH | Refills: 3 | Status: SHIPPED | OUTPATIENT
Start: 2023-09-28

## 2023-11-06 ENCOUNTER — OFFICE VISIT (OUTPATIENT)
Dept: ENT CLINIC | Age: 33
End: 2023-11-06
Payer: COMMERCIAL

## 2023-11-06 VITALS — WEIGHT: 110 LBS | BODY MASS INDEX: 18.78 KG/M2 | HEIGHT: 64 IN

## 2023-11-06 DIAGNOSIS — J34.89 RHINORRHEA: Primary | ICD-10-CM

## 2023-11-06 DIAGNOSIS — J33.9 NASAL POLYP: ICD-10-CM

## 2023-11-06 DIAGNOSIS — J32.9 CHRONIC SINUSITIS, UNSPECIFIED LOCATION: ICD-10-CM

## 2023-11-06 DIAGNOSIS — H61.23 BILATERAL IMPACTED CERUMEN: ICD-10-CM

## 2023-11-06 PROCEDURE — 69210 REMOVE IMPACTED EAR WAX UNI: CPT | Performed by: OTOLARYNGOLOGY

## 2023-11-06 PROCEDURE — 99214 OFFICE O/P EST MOD 30 MIN: CPT | Performed by: OTOLARYNGOLOGY

## 2023-11-06 ASSESSMENT — ENCOUNTER SYMPTOMS
VOMITING: 0
COUGH: 0
SHORTNESS OF BREATH: 0

## 2023-11-06 NOTE — PROGRESS NOTES
[facility]  Dr. Bard Dav Hernandez. Ms.Ed        Patient Name:  Kamran Sadler  :  1990     CHIEF C/O:    Chief Complaint   Patient presents with    Follow-up     Patient presents for sinus/allergy symptoms with nasal polyps no new issues. HISTORY OBTAINED FROM:  patient    HISTORY OF PRESENT ILLNESS:       Humera Mancera is a 28y.o. year old female, here today for follow up of:       Patient seen and examined for history of known nasal polyposis chronic rhinosinusitis and bilateral cerumen impaction. Currently complains of only mild right-sided ear pain with no associated hearing loss or ear drainage. No complaints of nasal congestion fever chills sore throat she is currently tolerating medical therapy on in the form of nasal steroids and saline rinses.          Past Medical History:   Diagnosis Date    Asthma     follows with Dr Marla Henry    Bronchiectasis [facility])     Nasal polyp     Osteoporosis     Osteoporosis 2014    Premature baby     Scoliosis 3/9/2015    Seasonal allergies     TMJ (temporomandibular joint disorder)     Vitamin D deficiency     Vitamin D deficiency 2014     Past Surgical History:   Procedure Laterality Date    PFT-LAB  2014    SINUS ENDOSCOPY N/A 2021    REVISION FUNCTIONAL ENDOSCOPIC SINUS SURGERY performed by Meghann Gates DO at [facility] OR    SINUS SURGERY         Current Outpatient Medications:     fluticasone (FLONASE) 50 MCG/ACT nasal spray, 2 sprays by Nasal route daily 2 sprays each nostril once daily, Disp: 1 each, Rfl: 3    montelukast (SINGULAIR) 10 MG tablet, Take 1 tablet by mouth daily, Disp: 30 tablet, Rfl: 3    fluticasone-salmeterol (ADVAIR) 500-50 MCG/ACT AEPB diskus inhaler, Inhale 1 puff into the lungs in the morning and 1 puff in the evening., Disp: 60 each, Rfl: 3    ALLERGY RELIEF 180 MG tablet, TAKE 1 TABLET BY MOUTH IN THE MORNING, Disp: 90 tablet, Rfl: 3    FEROSUL 325 (65 Fe) MG tablet, TAKE 1 TABLET BY MOUTH TWICE DAILY,

## 2023-11-26 DIAGNOSIS — D50.9 IRON DEFICIENCY ANEMIA, UNSPECIFIED IRON DEFICIENCY ANEMIA TYPE: ICD-10-CM

## 2023-11-28 RX ORDER — FERROUS SULFATE 325(65) MG
TABLET ORAL
Qty: 180 TABLET | Refills: 0 | Status: SHIPPED | OUTPATIENT
Start: 2023-11-28

## 2023-11-28 NOTE — TELEPHONE ENCOUNTER
Last Appointment:  12/5/2022  Future Appointments   Date Time Provider Department Center   5/6/2024 10:30 AM Kaiden Aguirre DO [facility] ENT [facility]

## 2023-12-27 DIAGNOSIS — J47.9 BRONCHIECTASIS WITHOUT COMPLICATION (HCC): ICD-10-CM

## 2023-12-27 DIAGNOSIS — J45.909 UNCOMPLICATED ASTHMA, UNSPECIFIED ASTHMA SEVERITY, UNSPECIFIED WHETHER PERSISTENT: ICD-10-CM

## 2023-12-28 RX ORDER — FLUTICASONE PROPIONATE AND SALMETEROL 500; 50 UG/1; UG/1
1 POWDER RESPIRATORY (INHALATION) EVERY 12 HOURS
Qty: 60 EACH | Refills: 3 | Status: SHIPPED | OUTPATIENT
Start: 2023-12-28

## 2024-01-04 ENCOUNTER — TELEPHONE (OUTPATIENT)
Dept: PULMONOLOGY | Age: 34
End: 2024-01-04

## 2024-01-04 DIAGNOSIS — J47.9 BRONCHIECTASIS WITHOUT COMPLICATION (HCC): Primary | ICD-10-CM

## 2024-01-04 DIAGNOSIS — J45.909 UNCOMPLICATED ASTHMA, UNSPECIFIED ASTHMA SEVERITY, UNSPECIFIED WHETHER PERSISTENT: ICD-10-CM

## 2024-01-04 RX ORDER — PREDNISONE 10 MG/1
TABLET ORAL
Qty: 30 TABLET | Refills: 0 | Status: SHIPPED | OUTPATIENT
Start: 2024-01-04

## 2024-01-04 RX ORDER — AMOXICILLIN AND CLAVULANATE POTASSIUM 875; 125 MG/1; MG/1
1 TABLET, FILM COATED ORAL 2 TIMES DAILY
Qty: 20 TABLET | Refills: 0 | Status: SHIPPED | OUTPATIENT
Start: 2024-01-04 | End: 2024-01-14

## 2024-01-04 NOTE — TELEPHONE ENCOUNTER
Patient called office back.  She states that her chest feels very heavy and she is coughing up thick mucus.  Patient states that her left side is hurting her especially.  Patient states that she has been using her nebulizer but it is not helping.  Patient requesting antibiotics and steroids.  Patient's allergies confirmed.  Dr. Zelaya made aware of patient's complaints and scripts were generated and sent to patient's requested pharmacy.

## 2024-01-04 NOTE — TELEPHONE ENCOUNTER
A message was left on the patient's voicemail requesting the patient call the office again to get more details on the patient's respiratory complaints.

## 2024-01-23 ENCOUNTER — TELEPHONE (OUTPATIENT)
Dept: ENT CLINIC | Age: 34
End: 2024-01-23

## 2024-01-23 RX ORDER — FLUTICASONE PROPIONATE 50 MCG
SPRAY, SUSPENSION (ML) NASAL
Qty: 1 EACH | Refills: 3 | Status: SHIPPED | OUTPATIENT
Start: 2024-01-23

## 2024-01-23 RX ORDER — MONTELUKAST SODIUM 10 MG/1
10 TABLET ORAL DAILY
Qty: 30 TABLET | Refills: 3 | Status: SHIPPED | OUTPATIENT
Start: 2024-01-23

## 2024-02-22 DIAGNOSIS — D50.9 IRON DEFICIENCY ANEMIA, UNSPECIFIED IRON DEFICIENCY ANEMIA TYPE: ICD-10-CM

## 2024-02-22 RX ORDER — FERROUS SULFATE 325(65) MG
TABLET ORAL
Qty: 180 TABLET | Refills: 0 | OUTPATIENT
Start: 2024-02-22

## 2024-02-22 NOTE — TELEPHONE ENCOUNTER
Last Appointment:  12/5/2022  Future Appointments   Date Time Provider Department Center   5/6/2024 10:30 AM Kaiden Aguirre DO [facility] ENT [facility]

## 2024-02-23 DIAGNOSIS — D50.9 IRON DEFICIENCY ANEMIA, UNSPECIFIED IRON DEFICIENCY ANEMIA TYPE: ICD-10-CM

## 2024-02-23 RX ORDER — FERROUS SULFATE 325(65) MG
1 TABLET ORAL 2 TIMES DAILY
Qty: 180 TABLET | Refills: 0 | OUTPATIENT
Start: 2024-02-23

## 2024-03-25 ENCOUNTER — TELEPHONE (OUTPATIENT)
Dept: ENT CLINIC | Age: 34
End: 2024-03-25

## 2024-03-25 RX ORDER — FLUTICASONE PROPIONATE 50 MCG
SPRAY, SUSPENSION (ML) NASAL
Qty: 1 EACH | Refills: 3 | Status: CANCELLED | OUTPATIENT
Start: 2024-03-25

## 2024-03-25 RX ORDER — ALBUTEROL SULFATE 2.5 MG/3ML
SOLUTION RESPIRATORY (INHALATION)
Qty: 900 ML | Refills: 0 | Status: SHIPPED | OUTPATIENT
Start: 2024-03-25

## 2024-03-26 RX ORDER — FLUTICASONE PROPIONATE 50 MCG
SPRAY, SUSPENSION (ML) NASAL
Qty: 16 G | Refills: 3 | Status: SHIPPED | OUTPATIENT
Start: 2024-03-26

## 2024-03-26 RX ORDER — FLUTICASONE PROPIONATE 50 MCG
SPRAY, SUSPENSION (ML) NASAL
Qty: 1 EACH | Refills: 3 | Status: SHIPPED | OUTPATIENT
Start: 2024-03-26

## 2024-04-03 ENCOUNTER — OFFICE VISIT (OUTPATIENT)
Dept: FAMILY MEDICINE CLINIC | Age: 34
End: 2024-04-03
Payer: COMMERCIAL

## 2024-04-03 VITALS
HEART RATE: 74 BPM | TEMPERATURE: 97.9 F | SYSTOLIC BLOOD PRESSURE: 110 MMHG | BODY MASS INDEX: 19.26 KG/M2 | OXYGEN SATURATION: 95 % | WEIGHT: 112.8 LBS | DIASTOLIC BLOOD PRESSURE: 70 MMHG | HEIGHT: 64 IN

## 2024-04-03 DIAGNOSIS — D50.9 IRON DEFICIENCY ANEMIA, UNSPECIFIED IRON DEFICIENCY ANEMIA TYPE: ICD-10-CM

## 2024-04-03 DIAGNOSIS — Z00.00 ENCOUNTER FOR WELL ADULT EXAM WITHOUT ABNORMAL FINDINGS: Primary | ICD-10-CM

## 2024-04-03 LAB
HCT VFR BLD CALC: 42.1 % (ref 34–48)
HEMOGLOBIN: 13.4 G/DL (ref 11.5–15.5)
IRON % SATURATION: 15 % (ref 15–50)
IRON: 48 UG/DL (ref 37–145)
MCH RBC QN AUTO: 28.3 PG (ref 26–35)
MCHC RBC AUTO-ENTMCNC: 31.8 G/DL (ref 32–34.5)
MCV RBC AUTO: 88.8 FL (ref 80–99.9)
PDW BLD-RTO: 13.1 % (ref 11.5–15)
PLATELET # BLD: 310 K/UL (ref 130–450)
PMV BLD AUTO: 9.6 FL (ref 7–12)
RBC # BLD: 4.74 M/UL (ref 3.5–5.5)
TOTAL IRON BINDING CAPACITY: 328 UG/DL (ref 250–450)
WBC # BLD: 8.3 K/UL (ref 4.5–11.5)

## 2024-04-03 PROCEDURE — 99395 PREV VISIT EST AGE 18-39: CPT | Performed by: INTERNAL MEDICINE

## 2024-04-03 RX ORDER — FERROUS SULFATE 325(65) MG
1 TABLET ORAL 2 TIMES DAILY
Qty: 180 TABLET | Refills: 3 | Status: SHIPPED | OUTPATIENT
Start: 2024-04-03

## 2024-04-03 SDOH — ECONOMIC STABILITY: FOOD INSECURITY: WITHIN THE PAST 12 MONTHS, YOU WORRIED THAT YOUR FOOD WOULD RUN OUT BEFORE YOU GOT MONEY TO BUY MORE.: NEVER TRUE

## 2024-04-03 SDOH — ECONOMIC STABILITY: HOUSING INSECURITY
IN THE LAST 12 MONTHS, WAS THERE A TIME WHEN YOU DID NOT HAVE A STEADY PLACE TO SLEEP OR SLEPT IN A SHELTER (INCLUDING NOW)?: NO

## 2024-04-03 SDOH — ECONOMIC STABILITY: FOOD INSECURITY: WITHIN THE PAST 12 MONTHS, THE FOOD YOU BOUGHT JUST DIDN'T LAST AND YOU DIDN'T HAVE MONEY TO GET MORE.: NEVER TRUE

## 2024-04-03 SDOH — ECONOMIC STABILITY: INCOME INSECURITY: HOW HARD IS IT FOR YOU TO PAY FOR THE VERY BASICS LIKE FOOD, HOUSING, MEDICAL CARE, AND HEATING?: NOT HARD AT ALL

## 2024-04-03 ASSESSMENT — PATIENT HEALTH QUESTIONNAIRE - PHQ9
SUM OF ALL RESPONSES TO PHQ QUESTIONS 1-9: 0
SUM OF ALL RESPONSES TO PHQ9 QUESTIONS 1 & 2: 0
SUM OF ALL RESPONSES TO PHQ QUESTIONS 1-9: 0
2. FEELING DOWN, DEPRESSED OR HOPELESS: NOT AT ALL
1. LITTLE INTEREST OR PLEASURE IN DOING THINGS: NOT AT ALL

## 2024-04-03 NOTE — PROGRESS NOTES
[facility] PRIMARY [facility]ARE  [address] [address]  Dept: [phone]  Dept Fax: [phone]     NAME: Marissa Mayo        :  1990        MRN:  [id number]    Chief Complaint   Patient presents with    Medication Refill     Iron pills need refilled    Annual Exam       Subjective     History of Present Illness  Marissa Mayo is a 33 y.o. female who presents today for routine annual follow up and medication refill.         Review of Systems  Please see HPI above. Comprehensive review of systems negative unless otherwise noted above.    Past Medical Hx:  Past Medical History:   Diagnosis Date    Asthma     follows with Dr Nicholson    Bronchiectasis (HCC)     Nasal polyp     Osteoporosis     Osteoporosis 2014    Premature baby     Scoliosis 3/9/2015    Seasonal allergies     TMJ (temporomandibular joint disorder)     Vitamin D deficiency     Vitamin D deficiency 2014       Past Surgical Hx:  Past Surgical History:   Procedure Laterality Date    PFT-LAB  2014    SINUS ENDOSCOPY N/A 2021    REVISION FUNCTIONAL ENDOSCOPIC SINUS SURGERY performed by Kaiden Aguirre DO at [facility] OR    SINUS SURGERY         Family Hx:  Family History   Problem Relation Age of Onset    Asthma Sister     Asthma Sister        Social Hx:  Social History     Tobacco Use    Smoking status: Never    Smokeless tobacco: Never   Substance Use Topics    Alcohol use: No       Home Medications:  Current Outpatient Medications   Medication Sig Dispense Refill    ferrous sulfate (FEROSUL) 325 (65 Fe) MG tablet Take 1 tablet by mouth 2 times daily 180 tablet 3    fluticasone (FLONASE) 50 MCG/ACT nasal spray SHAKE LIQUID AND USE 2 SPRAYS IN EACH NOSTRIL EVERY DAY 16 g 3    fluticasone (FLONASE) 50 MCG/ACT nasal spray SHAKE LIQUID AND USE 2 SPRAYS IN EACH NOSTRIL EVERY DAY 1 each 3    albuterol (PROVENTIL) (2.5 MG/3ML) 0.083% nebulizer solution USE 1 VIAL VIA NEBULIZER

## 2024-04-03 NOTE — PATIENT INSTRUCTIONS
hands, brush your teeth twice a day, and wear a seat belt in the car.   Where can you learn more?  Go to [email or web address] and enter P072 to learn more about \"Well Visit, Ages 18 to 65: Care Instructions.\"  Current as of: August 6, 2023               Content Version: 14.0  © 9979-4710 [employer].   Care instructions adapted under license by [employer]. If you have questions about a medical condition or this instruction, always ask your healthcare professional. [employer] disclaims any warranty or liability for your use of this information.

## 2024-05-06 ENCOUNTER — OFFICE VISIT (OUTPATIENT)
Dept: ENT CLINIC | Age: 34
End: 2024-05-06
Payer: COMMERCIAL

## 2024-05-06 VITALS — HEIGHT: 64 IN | WEIGHT: 107 LBS | BODY MASS INDEX: 18.27 KG/M2

## 2024-05-06 DIAGNOSIS — H61.23 BILATERAL IMPACTED CERUMEN: ICD-10-CM

## 2024-05-06 DIAGNOSIS — J32.9 CHRONIC SINUSITIS, UNSPECIFIED LOCATION: Primary | ICD-10-CM

## 2024-05-06 DIAGNOSIS — J34.89 RHINORRHEA: ICD-10-CM

## 2024-05-06 PROCEDURE — 69210 REMOVE IMPACTED EAR WAX UNI: CPT | Performed by: OTOLARYNGOLOGY

## 2024-05-06 PROCEDURE — 99213 OFFICE O/P EST LOW 20 MIN: CPT | Performed by: OTOLARYNGOLOGY

## 2024-05-06 RX ORDER — MONTELUKAST SODIUM 10 MG/1
10 TABLET ORAL DAILY
Qty: 90 TABLET | Refills: 3 | Status: SHIPPED | OUTPATIENT
Start: 2024-05-06

## 2024-05-06 RX ORDER — FEXOFENADINE HCL 180 MG/1
180 TABLET ORAL EVERY MORNING
Qty: 90 TABLET | Refills: 3 | Status: SHIPPED | OUTPATIENT
Start: 2024-05-06

## 2024-05-06 ASSESSMENT — ENCOUNTER SYMPTOMS
SHORTNESS OF BREATH: 0
VOICE CHANGE: 0
VOMITING: 0
TROUBLE SWALLOWING: 0
COUGH: 0
SINUS PAIN: 0
RHINORRHEA: 0
SORE THROAT: 0

## 2024-05-06 NOTE — PROGRESS NOTES
throat, trouble swallowing and voice change.    Respiratory:  Negative for cough and shortness of breath.    Cardiovascular:  Negative for chest pain and palpitations.   Gastrointestinal:  Negative for vomiting.   Skin:  Negative for rash.   Allergic/Immunologic: Negative for environmental allergies.   Neurological:  Negative for dizziness and headaches.   Hematological:  Does not bruise/bleed easily.   All other systems reviewed and are negative.      Ht 1.626 m (5' 4\")   Wt 48.5 kg (107 lb)   BMI 18.37 kg/m²   Physical Exam  Vitals and nursing note reviewed.   Constitutional:       Appearance: She is well-developed.   HENT:      Head: Normocephalic and atraumatic. No contusion, masses or laceration.      Jaw: No tenderness or swelling.      Right Ear: Tympanic membrane and ear canal normal. No middle ear effusion. There is impacted cerumen.      Left Ear: Tympanic membrane and ear canal normal.  No middle ear effusion. There is impacted cerumen.      Nose: Rhinorrhea present. No congestion. Rhinorrhea is clear.      Right Nostril: No epistaxis.      Left Nostril: No epistaxis.      Mouth/Throat:      Mouth: Mucous membranes are moist. No oral lesions.      Dentition: No gum lesions.      Pharynx: No oropharyngeal exudate or posterior oropharyngeal erythema.   Eyes:      Pupils: Pupils are equal, round, and reactive to light.   Neck:      Thyroid: No thyromegaly.      Trachea: No tracheal deviation.   Cardiovascular:      Rate and Rhythm: Normal rate.   Pulmonary:      Effort: Pulmonary effort is normal. No respiratory distress.   Musculoskeletal:         General: Normal range of motion.      Cervical back: Normal range of motion.   Lymphadenopathy:      Cervical: No cervical adenopathy.   Skin:     General: Skin is warm.      Findings: No erythema.   Neurological:      Mental Status: She is alert.      Cranial Nerves: No cranial nerve deficit.         Procedure: Cerumen Impaction    Pre-op: Cerumen

## 2024-05-23 ENCOUNTER — TELEPHONE (OUTPATIENT)
Dept: PULMONOLOGY | Age: 34
End: 2024-05-23

## 2024-05-23 DIAGNOSIS — J47.9 BRONCHIECTASIS WITHOUT COMPLICATION (HCC): ICD-10-CM

## 2024-05-23 DIAGNOSIS — J45.909 UNCOMPLICATED ASTHMA, UNSPECIFIED ASTHMA SEVERITY, UNSPECIFIED WHETHER PERSISTENT: ICD-10-CM

## 2024-05-23 RX ORDER — FLUTICASONE PROPIONATE AND SALMETEROL 500; 50 UG/1; UG/1
1 POWDER RESPIRATORY (INHALATION) EVERY 12 HOURS
Qty: 60 EACH | Refills: 3 | Status: SHIPPED | OUTPATIENT
Start: 2024-05-23

## 2024-06-20 RX ORDER — ALBUTEROL SULFATE 90 UG/1
2 AEROSOL, METERED RESPIRATORY (INHALATION) EVERY 4 HOURS PRN
Qty: 1 EACH | Refills: 12 | Status: SHIPPED | OUTPATIENT
Start: 2024-06-20 | End: 2024-07-20

## 2024-08-31 ENCOUNTER — TELEPHONE (OUTPATIENT)
Dept: ENT CLINIC | Age: 34
End: 2024-08-31

## 2024-08-31 DIAGNOSIS — J32.9 CHRONIC SINUSITIS, UNSPECIFIED LOCATION: ICD-10-CM

## 2024-09-03 RX ORDER — MONTELUKAST SODIUM 10 MG/1
10 TABLET ORAL DAILY
Qty: 30 TABLET | Refills: 3 | Status: SHIPPED | OUTPATIENT
Start: 2024-09-03

## 2024-11-11 ENCOUNTER — OFFICE VISIT (OUTPATIENT)
Dept: ENT CLINIC | Age: 34
End: 2024-11-11
Payer: COMMERCIAL

## 2024-11-11 VITALS
WEIGHT: 107.8 LBS | SYSTOLIC BLOOD PRESSURE: 134 MMHG | HEART RATE: 81 BPM | BODY MASS INDEX: 18.4 KG/M2 | HEIGHT: 64 IN | OXYGEN SATURATION: 97 % | DIASTOLIC BLOOD PRESSURE: 86 MMHG | TEMPERATURE: 97.6 F

## 2024-11-11 DIAGNOSIS — J33.9 NASAL POLYP: ICD-10-CM

## 2024-11-11 DIAGNOSIS — H61.23 BILATERAL IMPACTED CERUMEN: ICD-10-CM

## 2024-11-11 DIAGNOSIS — J32.9 CHRONIC SINUSITIS, UNSPECIFIED LOCATION: Primary | ICD-10-CM

## 2024-11-11 PROCEDURE — 99213 OFFICE O/P EST LOW 20 MIN: CPT | Performed by: OTOLARYNGOLOGY

## 2024-11-11 PROCEDURE — 69210 REMOVE IMPACTED EAR WAX UNI: CPT | Performed by: OTOLARYNGOLOGY

## 2024-11-11 RX ORDER — FLUTICASONE PROPIONATE 50 MCG
2 SPRAY, SUSPENSION (ML) NASAL DAILY
Qty: 1 EACH | Refills: 3 | Status: SHIPPED | OUTPATIENT
Start: 2024-11-11

## 2024-11-11 ASSESSMENT — ENCOUNTER SYMPTOMS
COUGH: 0
VOICE CHANGE: 0
VOMITING: 0
TROUBLE SWALLOWING: 0
SORE THROAT: 0
SHORTNESS OF BREATH: 0
RHINORRHEA: 0
SINUS PAIN: 0

## 2024-11-11 NOTE — PROGRESS NOTES
1 each, Rfl: 0    vitamin D (ERGOCALCIFEROL) 1.25 MG (36922 UT) CAPS capsule, Take 1 capsule by mouth once a week, Disp: 12 capsule, Rfl: 1    albuterol sulfate HFA (PROAIR HFA) 108 (90 Base) MCG/ACT inhaler, Inhale 2 puffs into the lungs every 4 hours as needed for Wheezing or Shortness of Breath, Disp: 1 each, Rfl: 12    fexofenadine (ALLERGY RELIEF) 180 MG tablet, Take 1 tablet by mouth every morning (Patient not taking: Reported on 11/11/2024), Disp: 90 tablet, Rfl: 3    fluticasone (FLONASE) 50 MCG/ACT nasal spray, SHAKE LIQUID AND USE 2 SPRAYS IN EACH NOSTRIL EVERY DAY, Disp: 16 g, Rfl: 3    fluticasone (FLONASE) 50 MCG/ACT nasal spray, SHAKE LIQUID AND USE 2 SPRAYS IN EACH NOSTRIL EVERY DAY, Disp: 1 each, Rfl: 3    predniSONE (DELTASONE) 10 MG tablet, Take 40mg x 3 days, 30mg x 3 days, 20mg x 3 days, 10mg x 3 days. (Patient not taking: Reported on 11/11/2024), Disp: 30 tablet, Rfl: 0    brompheniramine-pseudoephedrine-DM (BROMFED DM) 2-30-10 MG/5ML syrup, Take 10 mLs by mouth every 6 hours as needed for Congestion or Cough (Patient not taking: Reported on 11/11/2024), Disp: 240 mL, Rfl: 0    Calcium Carbonate-Vit D-Min (CALTRATE 600+D PLUS MINERALS) 600-800 MG-UNIT CHEW, Take 600 mg by mouth in the morning and at bedtime, Disp: 60 tablet, Rfl: 5    Nebulizers (PORTABLE COMPRESSOR NEBULIZER) MISC, Reason: Severe asthma and bronchiectasis (Patient not taking: Reported on 11/11/2024), Disp: 1 each, Rfl: 0    Nebulizers (VIOS AEROSOL DELIVERY SYSTEM) [facility] [place], USE AS DIRECTED (Patient not taking: Reported on 11/11/2024), Disp: 1 each, Rfl: 0  Patient has no known allergies.  Social History     Tobacco Use    Smoking status: Never    Smokeless tobacco: Never   Vaping Use    Vaping status: Never Used   Substance Use Topics    Alcohol use: No    Drug use: No     Family History   Problem Relation Age of Onset    Asthma Sister     Asthma Sister        Review of Systems   Constitutional:  Negative for chills and fever.

## 2024-12-12 NOTE — PROGRESS NOTES
deficiency anemia, unspecified iron deficiency anemia type  -Taking iron BID  -Assess current level and adjust if needed   - CBC with Auto Differential  - Iron and TIBC    3. Hyperlipidemia, unspecified hyperlipidemia type  -Not on statin   - Lipid Panel    4. Hyperglycemia  - Hemoglobin A1C    5. Vitamin D deficiency  -Assess dosing of current supplement   - Vitamin D 25 Hydroxy    6. Other fatigue  - TSH      Return to office: Return in about 2 weeks (around 12/27/2024).    Patient agrees with the above stated plan.    Marissa received counseling on the following healthy behaviors: nutrition, exercise, and medication adherence.    As above.  Follow up in 2 weeks regarding abdomen after imaging, or sooner if necessary.      Counseled regarding above diagnosis, including possible risks and complications, especially if left uncontrolled. I encourage you to do some reading and education about your health. The [employer] provides information on many health conditions:http://familydoctor.org     Counseled regarding the possible side effects, risks, benefits and alternatives to treatment; patient and/or guardian verbalizes understanding, agrees, feels comfortable with and wishes to proceed with above treatment plan.    Advised patient to call with any new medication issues, and read all Rx info from pharmacy to assure aware of all possible risks and side effects of medication before taking.    Reviewed age and gender appropriate health screening exams and vaccinations.  Advised patient regarding importance of keeping up with recommended health maintenance and to schedule as soon as possible if overdue, as this is important in assessing for undiagnosed pathology, especially cancer, as well as protecting against potentially harmful/life threatening disease.      Patient and/or guardian verbalizes understanding and agrees with above counseling, assessment and plan.     Medication List:  Current

## 2024-12-13 ENCOUNTER — OFFICE VISIT (OUTPATIENT)
Dept: FAMILY MEDICINE CLINIC | Age: 34
End: 2024-12-13

## 2024-12-13 VITALS
BODY MASS INDEX: 18.95 KG/M2 | WEIGHT: 111 LBS | OXYGEN SATURATION: 99 % | TEMPERATURE: 97.4 F | HEIGHT: 64 IN | DIASTOLIC BLOOD PRESSURE: 76 MMHG | SYSTOLIC BLOOD PRESSURE: 128 MMHG | HEART RATE: 96 BPM

## 2024-12-13 DIAGNOSIS — N93.9 ABNORMAL UTERINE BLEEDING (AUB): Primary | ICD-10-CM

## 2024-12-13 DIAGNOSIS — R73.9 HYPERGLYCEMIA: ICD-10-CM

## 2024-12-13 DIAGNOSIS — E78.5 HYPERLIPIDEMIA, UNSPECIFIED HYPERLIPIDEMIA TYPE: ICD-10-CM

## 2024-12-13 DIAGNOSIS — D50.9 IRON DEFICIENCY ANEMIA, UNSPECIFIED IRON DEFICIENCY ANEMIA TYPE: ICD-10-CM

## 2024-12-13 DIAGNOSIS — E55.9 VITAMIN D DEFICIENCY: ICD-10-CM

## 2024-12-13 DIAGNOSIS — R53.83 OTHER FATIGUE: ICD-10-CM

## 2024-12-13 LAB
ALBUMIN: 4.6 G/DL (ref 3.5–5.2)
ALP BLD-CCNC: 72 U/L (ref 35–104)
ALT SERPL-CCNC: 15 U/L (ref 0–32)
ANION GAP SERPL CALCULATED.3IONS-SCNC: 16 MMOL/L (ref 7–16)
AST SERPL-CCNC: 19 U/L (ref 0–31)
BILIRUB SERPL-MCNC: 0.5 MG/DL (ref 0–1.2)
BUN BLDV-MCNC: 11 MG/DL (ref 6–20)
CALCIUM SERPL-MCNC: 9.5 MG/DL (ref 8.6–10.2)
CHLORIDE BLD-SCNC: 100 MMOL/L (ref 98–107)
CHOLESTEROL, TOTAL: 185 MG/DL
CO2: 23 MMOL/L (ref 22–29)
CREAT SERPL-MCNC: 0.6 MG/DL (ref 0.5–1)
GFR, ESTIMATED: >90 ML/MIN/1.73M2
GLUCOSE BLD-MCNC: 96 MG/DL (ref 74–99)
HDLC SERPL-MCNC: 93 MG/DL
IRON % SATURATION: 10 % (ref 15–50)
IRON: 31 UG/DL (ref 37–145)
LDL CHOLESTEROL: 83 MG/DL
POTASSIUM SERPL-SCNC: 4 MMOL/L (ref 3.5–5)
SODIUM BLD-SCNC: 139 MMOL/L (ref 132–146)
TOTAL IRON BINDING CAPACITY: 313 UG/DL (ref 250–450)
TOTAL PROTEIN: 8.5 G/DL (ref 6.4–8.3)
TRIGL SERPL-MCNC: 45 MG/DL
TSH SERPL DL<=0.05 MIU/L-ACNC: 0.66 UIU/ML (ref 0.27–4.2)
VITAMIN D 25-HYDROXY: 39 NG/ML (ref 30–100)
VLDLC SERPL CALC-MCNC: 9 MG/DL

## 2024-12-14 LAB
BASOPHILS ABSOLUTE: 0.05 K/UL (ref 0–0.2)
BASOPHILS RELATIVE PERCENT: 1 % (ref 0–2)
EOSINOPHILS ABSOLUTE: 0.05 K/UL (ref 0.05–0.5)
EOSINOPHILS RELATIVE PERCENT: 1 % (ref 0–6)
HBA1C MFR BLD: 5.1 % (ref 4–5.6)
HCT VFR BLD CALC: 45.1 % (ref 34–48)
HEMOGLOBIN: 13.8 G/DL (ref 11.5–15.5)
IMMATURE GRANULOCYTES %: 0 % (ref 0–5)
IMMATURE GRANULOCYTES ABSOLUTE: 0.04 K/UL (ref 0–0.58)
LYMPHOCYTES ABSOLUTE: 1.55 K/UL (ref 1.5–4)
LYMPHOCYTES RELATIVE PERCENT: 14 % (ref 20–42)
MCH RBC QN AUTO: 27.9 PG (ref 26–35)
MCHC RBC AUTO-ENTMCNC: 30.6 G/DL (ref 32–34.5)
MCV RBC AUTO: 91.1 FL (ref 80–99.9)
MONOCYTES ABSOLUTE: 0.72 K/UL (ref 0.1–0.95)
MONOCYTES RELATIVE PERCENT: 7 % (ref 2–12)
NEUTROPHILS ABSOLUTE: 8.5 K/UL (ref 1.8–7.3)
NEUTROPHILS RELATIVE PERCENT: 78 % (ref 43–80)
PDW BLD-RTO: 13.3 % (ref 11.5–15)
PLATELET # BLD: 294 K/UL (ref 130–450)
PMV BLD AUTO: 10.8 FL (ref 7–12)
RBC # BLD: 4.95 M/UL (ref 3.5–5.5)
WBC # BLD: 10.9 K/UL (ref 4.5–11.5)

## 2024-12-14 ASSESSMENT — ENCOUNTER SYMPTOMS
ABDOMINAL PAIN: 0
EYE PAIN: 0
ABDOMINAL DISTENTION: 1
SHORTNESS OF BREATH: 0
VOMITING: 0
SINUS PAIN: 0
WHEEZING: 0
NAUSEA: 1
SORE THROAT: 0
COUGH: 0

## 2024-12-14 NOTE — RESULT ENCOUNTER NOTE
Labs are stable overall. Iron is slightly low but blood count normal so continue on current supplement.

## 2024-12-16 ENCOUNTER — HOSPITAL ENCOUNTER (OUTPATIENT)
Dept: CT IMAGING | Age: 34
Discharge: HOME | End: 2024-12-18
Attending: STUDENT IN AN ORGANIZED HEALTH CARE EDUCATION/TRAINING PROGRAM
Payer: COMMERCIAL

## 2024-12-16 DIAGNOSIS — N93.9 ABNORMAL UTERINE BLEEDING (AUB): ICD-10-CM

## 2024-12-16 DIAGNOSIS — N85.8 UTERINE MASS: Primary | ICD-10-CM

## 2024-12-16 PROCEDURE — 74178 CT ABD&PLV WO CNTR FLWD CNTR: CPT

## 2024-12-16 PROCEDURE — 6360000004 HC RX CONTRAST MEDICATION: Performed by: RADIOLOGY

## 2024-12-16 RX ORDER — IOPAMIDOL 755 MG/ML
75 INJECTION, SOLUTION INTRAVASCULAR
Status: COMPLETED | OUTPATIENT
Start: 2024-12-16 | End: 2024-12-16

## 2024-12-16 RX ADMIN — IOPAMIDOL 75 ML: 755 INJECTION, SOLUTION INTRAVENOUS at 14:54

## 2024-12-16 NOTE — RESULT ENCOUNTER NOTE
Large uterine mass demonstrated on CT imaging. Not sure if GYN called to schedule yet but am sending another referral and messaging them again. Patient needs to schedule with them. Thank you

## 2024-12-17 ENCOUNTER — PATIENT MESSAGE (OUTPATIENT)
Dept: FAMILY MEDICINE CLINIC | Age: 34
End: 2024-12-17

## 2024-12-17 ENCOUNTER — TELEPHONE (OUTPATIENT)
Dept: OBGYN | Age: 34
End: 2024-12-17

## 2024-12-17 ENCOUNTER — TELEPHONE (OUTPATIENT)
Dept: ADMINISTRATIVE | Age: 34
End: 2024-12-17

## 2024-12-17 NOTE — TELEPHONE ENCOUNTER
The left sided pain could be from the mass compressing your bowels because you did have stool in the colon (constipation) on the image. I am working to get you in sooner with GYN. But for now next steps are the ultrasound and I am waiting to hear back directly from the gynecologist.

## 2024-12-17 NOTE — TELEPHONE ENCOUNTER
Received a call from pre service regarding a referral for a uterine mass.  Please look at patient's CT and advise on scheduling, she does not want a male provider.  Your first available isn't until 4/2.

## 2024-12-17 NOTE — TELEPHONE ENCOUNTER
Any further imaging other than the ultrasound I would defer to gyn. Your appointment with April is scheduled with the physician and not an NP. I did also message the doctor directly again to try to get you in with her sooner. NPs are typcially not the ones who do procedures for things like this so it will be with the physician. I am not sure where the biopsy will be done at. It might be in the office or they may need to do it in the operating room. These will be questions they will be able to answer and this first visit will be likely consultation and discussing the results and what next steps are.

## 2024-12-19 ENCOUNTER — OFFICE VISIT (OUTPATIENT)
Dept: OBGYN | Age: 34
End: 2024-12-19
Payer: COMMERCIAL

## 2024-12-19 VITALS
WEIGHT: 108 LBS | SYSTOLIC BLOOD PRESSURE: 140 MMHG | BODY MASS INDEX: 18.44 KG/M2 | HEIGHT: 64 IN | HEART RATE: 101 BPM | DIASTOLIC BLOOD PRESSURE: 78 MMHG

## 2024-12-19 DIAGNOSIS — N85.8 UTERINE MASS: Primary | ICD-10-CM

## 2024-12-19 PROCEDURE — 99203 OFFICE O/P NEW LOW 30 MIN: CPT | Performed by: OBSTETRICS & GYNECOLOGY

## 2024-12-19 NOTE — PROGRESS NOTES
Patient alert and pleasant with concerns about a CT result   Pelvic exam, no specimens obtained.   Discharge instructions have been discussed with the patient. Patient advised to call our office with any questions or concerns.   Voiced understanding.   
outpatient. Patient is aware of plan.     Return if symptoms worsen or fail to improve.     Ronda Saavedra MD

## 2024-12-23 PROBLEM — N85.8 UTERINE MASS: Status: ACTIVE | Noted: 2024-12-23

## 2024-12-23 NOTE — PROGRESS NOTES
[facility]      Department of Family Medicine  Phone: [phone]   Fax: [phone]    12/30/24    Marissa Mayo is a 34 y.o. female with PMHx of uterine mass, osteoporosis, bronchiectasis, Vit D def, asthma, history premature birth presenting to the outpatient clinic for:  Chief Complaint   Patient presents with    Follow-up     Uterine mass    Nausea     She would like to be prescribed zofran, swollen lymph nodes in chest        HPI:    Uterine mass follow-up  CT abdomen and pelvis on 12/16: showed mixed attenuation mass in the anterior myometrium that extends to umbilcus measuring [id number] Bilateral adnexal cysts noted   Got into gynecology on 12/19 (lindsay) who referred her to Gyn/Onc (Dr. Hall)    Relugolix-Estradiol-Norethind (Myfembree) 40-1-0.5 MG tablet is still not covered by insurance--patient is awaiting to hear back from gyn/onc for further recommendations  Discussed office visits with patient and what specialist recommended  Discussed how per specialist notes it does not seem that this will improve with medication  Patient elaborated on hesitancy for surgery--family is really giving a lot of pushback; multiple family members are Nps and most of them are telling her she does not need surgery but 1 is backing her up; hesitant on the fact that she is unmarried and wanting to have children in the future and what that would look like; had thought about freezing her eggs but her parents told her that this is against her [employer] and that no [employer] man would  her if she did that she is causing significant stress; having regret about not going to a gynecologist for [employer] reasons as she feels this problem could have been caught 10 years ago when her symptoms first started; has significant fear regarding surgery with her lung conditions and what that would look like and whether pulmonology would even clear her for procedure      Seen by gyn/onc

## 2024-12-30 ENCOUNTER — OFFICE VISIT (OUTPATIENT)
Dept: FAMILY MEDICINE CLINIC | Age: 34
End: 2024-12-30
Payer: COMMERCIAL

## 2024-12-30 VITALS
HEIGHT: 64 IN | TEMPERATURE: 98 F | WEIGHT: 110.4 LBS | SYSTOLIC BLOOD PRESSURE: 108 MMHG | DIASTOLIC BLOOD PRESSURE: 72 MMHG | OXYGEN SATURATION: 99 % | HEART RATE: 75 BPM | BODY MASS INDEX: 18.85 KG/M2

## 2024-12-30 DIAGNOSIS — R11.0 NAUSEA: ICD-10-CM

## 2024-12-30 DIAGNOSIS — N85.8 UTERINE MASS: Primary | ICD-10-CM

## 2024-12-30 DIAGNOSIS — J47.9 BRONCHIECTASIS WITHOUT COMPLICATION (HCC): ICD-10-CM

## 2024-12-30 PROCEDURE — 99214 OFFICE O/P EST MOD 30 MIN: CPT | Performed by: STUDENT IN AN ORGANIZED HEALTH CARE EDUCATION/TRAINING PROGRAM

## 2024-12-30 RX ORDER — ONDANSETRON 4 MG/1
4 TABLET, FILM COATED ORAL DAILY PRN
Qty: 30 TABLET | Refills: 0 | Status: SHIPPED | OUTPATIENT
Start: 2024-12-30

## 2024-12-30 RX ORDER — RELUGOLIX, ESTRADIOL HEMIHYDRATE, AND NORETHINDRONE ACETATE 40; 1; .5 MG/1; MG/1; MG/1
1 TABLET, FILM COATED ORAL DAILY
COMMUNITY
Start: 2024-12-20 | End: 2025-03-20

## 2024-12-30 ASSESSMENT — ENCOUNTER SYMPTOMS
NAUSEA: 1
SINUS PAIN: 0
SHORTNESS OF BREATH: 0
ABDOMINAL PAIN: 0
WHEEZING: 0
ABDOMINAL DISTENTION: 1
VOMITING: 0
EYE PAIN: 0
SORE THROAT: 0
COUGH: 0

## 2025-01-02 ENCOUNTER — TELEPHONE (OUTPATIENT)
Dept: FAMILY MEDICINE CLINIC | Age: 35
End: 2025-01-02

## 2025-01-02 DIAGNOSIS — F41.9 ANXIETY: Primary | ICD-10-CM

## 2025-01-02 RX ORDER — HYDROXYZINE PAMOATE 25 MG/1
25 CAPSULE ORAL 3 TIMES DAILY PRN
Qty: 90 CAPSULE | Refills: 0 | Status: SHIPPED | OUTPATIENT
Start: 2025-01-02

## 2025-01-02 RX ORDER — SERTRALINE HYDROCHLORIDE 25 MG/1
25 TABLET, FILM COATED ORAL DAILY
Qty: 30 TABLET | Refills: 0 | Status: SHIPPED | OUTPATIENT
Start: 2025-01-02

## 2025-01-02 NOTE — TELEPHONE ENCOUNTER
Called in Vistaril 25 mg 3 times daily as needed to use for any acute anxiety or panic attacks.  Did also call in Zoloft at 25 mg daily which is a very low dose and takes usually about 4 weeks to get into the system fully.  If tolerating the Zoloft then can take 25 mg every day for 1 to 2 weeks and then increase to 50 mg to try to get to that therapeutic range quicker but some people do well just on the 25 mg daily.  I would need to see patient if she decides to begin medication in 4 weeks and this can be a virtual visit for a follow-up to see how she is faring on these medicines.

## 2025-01-16 ENCOUNTER — HOSPITAL ENCOUNTER (OUTPATIENT)
Dept: CT IMAGING | Age: 35
Discharge: HOME | End: 2025-01-18
Attending: STUDENT IN AN ORGANIZED HEALTH CARE EDUCATION/TRAINING PROGRAM
Payer: COMMERCIAL

## 2025-01-16 DIAGNOSIS — J47.9 BRONCHIECTASIS WITHOUT COMPLICATION (HCC): ICD-10-CM

## 2025-01-16 PROCEDURE — 71250 CT THORAX DX C-: CPT

## 2025-01-16 NOTE — RESULT ENCOUNTER NOTE
CT looks stable compared to 2014 for the most part and I do not have the records from in between.  But I know patient has pulmonology appointment to discuss scan further.

## 2025-02-12 ENCOUNTER — OFFICE VISIT (OUTPATIENT)
Dept: PULMONOLOGY | Age: 35
End: 2025-02-12
Payer: COMMERCIAL

## 2025-02-12 VITALS
SYSTOLIC BLOOD PRESSURE: 167 MMHG | OXYGEN SATURATION: 98 % | RESPIRATION RATE: 14 BRPM | DIASTOLIC BLOOD PRESSURE: 88 MMHG | HEART RATE: 99 BPM

## 2025-02-12 DIAGNOSIS — N83.8 OVARIAN MASS: ICD-10-CM

## 2025-02-12 DIAGNOSIS — J47.9 BRONCHIECTASIS WITHOUT COMPLICATION (HCC): Primary | ICD-10-CM

## 2025-02-12 DIAGNOSIS — J33.9 NASAL POLYP: ICD-10-CM

## 2025-02-12 PROCEDURE — 99213 OFFICE O/P EST LOW 20 MIN: CPT | Performed by: INTERNAL MEDICINE

## 2025-02-12 RX ORDER — AMOXICILLIN AND CLAVULANATE POTASSIUM 500; 125 MG/1; MG/1
1 TABLET, FILM COATED ORAL 3 TIMES DAILY
Qty: 30 TABLET | Refills: 0 | Status: SHIPPED | OUTPATIENT
Start: 2025-02-12 | End: 2025-02-22

## 2025-02-12 NOTE — PROGRESS NOTES
Patient to follow up with physician in one month.  Patient given samples of Spiriva 1.25mcg a months worth under the direction of Dr. Zelaya.

## 2025-02-12 NOTE — PROGRESS NOTES
[facility]  [facility]  Department of Internal Medicine  Division of Pulmonary, Critical Care and Sleep Medicine  Office Note    Dear Delano Marcano, DO    We had the pleasure of seeing Marissa Mayo in the [facility] for her central bronchiectasis. RML disease, nasal polyps and asthma, moderate airflow disease.     HISTORY OF PRESENT ILLNESS:    Marissa Mayo is a pleasant 34 y.o. female with a past medical history of scoliosis, allergies and asthma.   Ms. Mayo presents to the office today for follow up regarding her lung problems. She is present with her mother today to discuss recent finding on her CT scan.  Marissa was born premature (3 months) at 2 pounds, and spend month in the hospital. They were never told she had sinu.  undergone a work up. She says her breathing has improved but she continues to produces green sputum. She has occasional wheezing and chest tightness but this is improved. Her nighttime awakenings have improved, but they are still present occasionally. She uses her rescue inhaler several times a week and feels she needs it more but is afraid to use it because she isn't sure how much she can use. She has post nasal drip, rhinitis, snoring, hoarseness and occasional night sweats.  She denies any daytime somnolence, palpitations, PND or orthopnea.  She has had sinus surgery in 2011 and has had several steroid tapers since then. We reviewed the results of her DEXA bone scan and Vitamin D level which were low and will start her on calcium and a bisphosphonate.  We also found she has Spue antibodies + (done for the work up for osteoporosis - they don't wasn't a biopsy and started a gluten free diet.      Her CT with HRCT images were obtained and show central > distal bronchiectasis, and ground glass changes. Intially, she was given the diagnosis of kartagener syndrome. However continue review this issue

## 2025-03-24 DIAGNOSIS — F41.9 ANXIETY: ICD-10-CM

## 2025-03-24 RX ORDER — FLUTICASONE PROPIONATE 50 MCG
2 SPRAY, SUSPENSION (ML) NASAL DAILY
Qty: 1 EACH | Refills: 3 | Status: SHIPPED | OUTPATIENT
Start: 2025-03-24

## 2025-03-24 RX ORDER — FLUTICASONE PROPIONATE 50 MCG
2 SPRAY, SUSPENSION (ML) NASAL DAILY
Qty: 1 EACH | Refills: 3 | Status: CANCELLED | OUTPATIENT
Start: 2025-03-24

## 2025-03-25 RX ORDER — HYDROXYZINE PAMOATE 25 MG/1
CAPSULE ORAL
Qty: 90 CAPSULE | Refills: 0 | Status: SHIPPED | OUTPATIENT
Start: 2025-03-25

## 2025-03-26 DIAGNOSIS — J47.9 BRONCHIECTASIS WITHOUT COMPLICATION (HCC): ICD-10-CM

## 2025-03-26 DIAGNOSIS — J45.909 UNCOMPLICATED ASTHMA, UNSPECIFIED ASTHMA SEVERITY, UNSPECIFIED WHETHER PERSISTENT: ICD-10-CM

## 2025-03-26 RX ORDER — FLUTICASONE PROPIONATE AND SALMETEROL 500; 50 UG/1; UG/1
1 POWDER RESPIRATORY (INHALATION) EVERY 12 HOURS
Qty: 60 EACH | Refills: 3 | Status: SHIPPED | OUTPATIENT
Start: 2025-03-26

## 2025-03-28 ENCOUNTER — HOSPITAL ENCOUNTER (OUTPATIENT)
Age: 35
Discharge: HOME | End: 2025-03-28
Payer: COMMERCIAL

## 2025-03-28 LAB
ALBUMIN SERPL-MCNC: 4.5 G/DL (ref 3.5–5.2)
ALP SERPL-CCNC: 70 U/L (ref 35–104)
ALT SERPL-CCNC: 9 U/L (ref 0–32)
ANION GAP SERPL CALCULATED.3IONS-SCNC: 16 MMOL/L (ref 7–16)
AST SERPL-CCNC: 12 U/L (ref 0–31)
BILIRUB SERPL-MCNC: 0.4 MG/DL (ref 0–1.2)
BUN SERPL-MCNC: 13 MG/DL (ref 6–20)
CALCIUM SERPL-MCNC: 9.7 MG/DL (ref 8.6–10.2)
CHLORIDE SERPL-SCNC: 103 MMOL/L (ref 98–107)
CO2 SERPL-SCNC: 22 MMOL/L (ref 22–29)
CREAT SERPL-MCNC: 0.8 MG/DL (ref 0.5–1)
GFR, ESTIMATED: >90 ML/MIN/1.73M2
GLUCOSE SERPL-MCNC: 115 MG/DL (ref 74–99)
POTASSIUM SERPL-SCNC: 4.2 MMOL/L (ref 3.5–5)
PROT SERPL-MCNC: 8.1 G/DL (ref 6.4–8.3)
SODIUM SERPL-SCNC: 141 MMOL/L (ref 132–146)

## 2025-03-28 PROCEDURE — 80053 COMPREHEN METABOLIC PANEL: CPT

## 2025-03-28 PROCEDURE — 36415 COLL VENOUS BLD VENIPUNCTURE: CPT

## 2025-03-31 ENCOUNTER — HOSPITAL ENCOUNTER (OUTPATIENT)
Dept: CT IMAGING | Age: 35
Discharge: HOME | End: 2025-04-02
Payer: COMMERCIAL

## 2025-03-31 DIAGNOSIS — N85.8 UTERINE MASS: ICD-10-CM

## 2025-03-31 PROCEDURE — 2500000003 HC RX 250 WO HCPCS: Performed by: RADIOLOGY

## 2025-03-31 PROCEDURE — 6360000004 HC RX CONTRAST MEDICATION: Performed by: RADIOLOGY

## 2025-03-31 PROCEDURE — 74177 CT ABD & PELVIS W/CONTRAST: CPT

## 2025-03-31 RX ORDER — IOPAMIDOL 755 MG/ML
75 INJECTION, SOLUTION INTRAVASCULAR
Status: COMPLETED | OUTPATIENT
Start: 2025-03-31 | End: 2025-03-31

## 2025-03-31 RX ORDER — SODIUM CHLORIDE 0.9 % (FLUSH) 0.9 %
10 SYRINGE (ML) INJECTION PRN
Status: DISCONTINUED | OUTPATIENT
Start: 2025-03-31 | End: 2025-04-03 | Stop reason: HOSPADM

## 2025-03-31 RX ORDER — IOPAMIDOL 755 MG/ML
18 INJECTION, SOLUTION INTRAVASCULAR
Status: COMPLETED | OUTPATIENT
Start: 2025-03-31 | End: 2025-03-31

## 2025-03-31 RX ADMIN — Medication 10 ML: at 17:21

## 2025-03-31 RX ADMIN — IOPAMIDOL 75 ML: 755 INJECTION, SOLUTION INTRAVENOUS at 17:23

## 2025-03-31 RX ADMIN — IOPAMIDOL 18 ML: 755 INJECTION, SOLUTION INTRAVENOUS at 16:20

## 2025-05-12 ENCOUNTER — OFFICE VISIT (OUTPATIENT)
Dept: ENT CLINIC | Age: 35
End: 2025-05-12
Payer: COMMERCIAL

## 2025-05-12 VITALS
DIASTOLIC BLOOD PRESSURE: 90 MMHG | BODY MASS INDEX: 18.4 KG/M2 | WEIGHT: 107.8 LBS | SYSTOLIC BLOOD PRESSURE: 143 MMHG | HEART RATE: 92 BPM | TEMPERATURE: 98.3 F | HEIGHT: 64 IN

## 2025-05-12 DIAGNOSIS — H61.23 BILATERAL IMPACTED CERUMEN: ICD-10-CM

## 2025-05-12 DIAGNOSIS — J34.89 NASAL VESTIBULITIS: Primary | ICD-10-CM

## 2025-05-12 PROCEDURE — 69210 REMOVE IMPACTED EAR WAX UNI: CPT | Performed by: OTOLARYNGOLOGY

## 2025-05-12 PROCEDURE — 99213 OFFICE O/P EST LOW 20 MIN: CPT | Performed by: OTOLARYNGOLOGY

## 2025-05-12 RX ORDER — MUPIROCIN 20 MG/G
OINTMENT TOPICAL
Qty: 22 G | Refills: 0 | Status: SHIPPED | OUTPATIENT
Start: 2025-05-12

## 2025-05-12 ASSESSMENT — ENCOUNTER SYMPTOMS
SORE THROAT: 0
RHINORRHEA: 0
COUGH: 0
TROUBLE SWALLOWING: 0
SHORTNESS OF BREATH: 0
SINUS PAIN: 0
VOMITING: 0
VOICE CHANGE: 0

## 2025-05-28 DIAGNOSIS — F41.9 ANXIETY: ICD-10-CM

## 2025-05-28 RX ORDER — HYDROXYZINE PAMOATE 25 MG/1
CAPSULE ORAL
Qty: 90 CAPSULE | Refills: 0 | Status: SHIPPED | OUTPATIENT
Start: 2025-05-28

## 2025-06-27 ENCOUNTER — TELEPHONE (OUTPATIENT)
Dept: ENT CLINIC | Age: 35
End: 2025-06-27

## 2025-06-27 DIAGNOSIS — J32.9 CHRONIC SINUSITIS, UNSPECIFIED LOCATION: ICD-10-CM

## 2025-06-27 RX ORDER — MONTELUKAST SODIUM 10 MG/1
10 TABLET ORAL DAILY
Qty: 30 TABLET | Refills: 3 | Status: SHIPPED | OUTPATIENT
Start: 2025-06-27

## 2025-07-29 RX ORDER — FLUTICASONE PROPIONATE 50 MCG
2 SPRAY, SUSPENSION (ML) NASAL DAILY
Qty: 16 G | Refills: 5 | Status: SHIPPED | OUTPATIENT
Start: 2025-07-29

## 2025-08-04 ENCOUNTER — TELEPHONE (OUTPATIENT)
Dept: ENT CLINIC | Age: 35
End: 2025-08-04

## 2025-08-05 RX ORDER — FEXOFENADINE HCL 180 MG/1
180 TABLET ORAL DAILY
Qty: 30 TABLET | Refills: 0 | Status: SHIPPED | OUTPATIENT
Start: 2025-08-05 | End: 2025-09-04

## 2025-08-31 ENCOUNTER — TELEPHONE (OUTPATIENT)
Dept: ENT CLINIC | Age: 35
End: 2025-08-31

## 2025-08-31 DIAGNOSIS — F41.9 ANXIETY: ICD-10-CM

## 2025-09-02 RX ORDER — FEXOFENADINE HYDROCHLORIDE 180 MG/1
180 TABLET, FILM COATED ORAL DAILY
Qty: 30 TABLET | Refills: 1 | Status: SHIPPED | OUTPATIENT
Start: 2025-09-02

## 2025-09-02 RX ORDER — HYDROXYZINE PAMOATE 25 MG/1
CAPSULE ORAL
Qty: 90 CAPSULE | Refills: 0 | Status: SHIPPED | OUTPATIENT
Start: 2025-09-02

## (undated) DEVICE — TUBING 1895522 5PK STRAIGHTSHOT TO XPS: Brand: STRAIGHTSHOT®

## (undated) DEVICE — Device

## (undated) DEVICE — INSTRUMENT TRACKER 9733533XOM ENT 1PK

## (undated) DEVICE — TOWEL,OR,DSP,ST,BLUE,STD,6/PK,12PK/CS: Brand: MEDLINE

## (undated) DEVICE — SET NASAL

## (undated) DEVICE — SET SINUS ENDOSCOPY

## (undated) DEVICE — SPONGE GZ W4XL4IN RAYON POLY FILL CVR W/ NONWOVEN FAB

## (undated) DEVICE — SET MAGNUM STR SHOT

## (undated) DEVICE — SET SINUS SCOPE

## (undated) DEVICE — COAGULATOR SUCT 10FR LAIN FTSWCH ACTIVATION DISP VALLEYLAB

## (undated) DEVICE — BASIC SINGLE BASIN 1-LF: Brand: MEDLINE INDUSTRIES, INC.

## (undated) DEVICE — SINU FOAM: Brand: SINU-FOAM

## (undated) DEVICE — AGENT HEMSTAT 5GM ARISTA AH

## (undated) DEVICE — SOLUTION IV 500ML 0.9% SOD CHL PH 5 INJ USP VIAFLX PLAS

## (undated) DEVICE — GLOVE ORANGE PI 7   MSG9070

## (undated) DEVICE — CONTROL SYRINGE LUER-LOCK TIP: Brand: MONOJECT

## (undated) DEVICE — RETAINER 16IN ADJ EAR LOOP DRSG NSL NS

## (undated) DEVICE — 4-PORT MANIFOLD: Brand: NEPTUNE 2

## (undated) DEVICE — TUBING, SUCTION, 3/16" X 12', STRAIGHT: Brand: MEDLINE

## (undated) DEVICE — SOLUTION IV 1000ML 0.9% SOD CHL PH 5 INJ USP VIAFLX PLAS

## (undated) DEVICE — PATIENT TRACKER 9733534XOM ENT 1PK

## (undated) DEVICE — MARKER,SKIN,WI/RULER AND LABELS: Brand: MEDLINE

## (undated) DEVICE — CAMERA STRYKER STER

## (undated) DEVICE — CAMERA STRYKER 1488

## (undated) DEVICE — ADHESIVE PAD 9732500XOM 25PK ENT

## (undated) DEVICE — CODMAN® SURGICAL PATTIES 1/2" X 3" (1.27CM X 7.62CM): Brand: CODMAN®

## (undated) DEVICE — MAGNETIC INSTR DRAPE 20X16: Brand: MEDLINE INDUSTRIES, INC.

## (undated) DEVICE — LIGHT SOURCE WHT

## (undated) DEVICE — SYRINGE MED 50ML LUERLOCK TIP

## (undated) DEVICE — YANKAUER,OPEN TIP,W/O VENT,STERILE: Brand: MEDLINE INDUSTRIES, INC.

## (undated) DEVICE — GLOVE ORANGE PI 7 1/2   MSG9075

## (undated) DEVICE — NEEDLE FLTR 18GA L1.5IN MEM THK5UM BLNT DISP

## (undated) DEVICE — SURGICAL PROCEDURE PACK EENT CUST